# Patient Record
Sex: MALE | Race: WHITE | NOT HISPANIC OR LATINO | URBAN - METROPOLITAN AREA
[De-identification: names, ages, dates, MRNs, and addresses within clinical notes are randomized per-mention and may not be internally consistent; named-entity substitution may affect disease eponyms.]

---

## 2023-10-11 ENCOUNTER — DOCUMENTATION (OUTPATIENT)
Dept: TRANSPLANT | Facility: HOSPITAL | Age: 33
End: 2023-10-11
Payer: COMMERCIAL

## 2023-10-11 VITALS — BODY MASS INDEX: 28.53 KG/M2 | HEIGHT: 63 IN | WEIGHT: 161 LBS

## 2023-11-29 ENCOUNTER — DOCUMENTATION (OUTPATIENT)
Dept: TRANSPLANT | Facility: HOSPITAL | Age: 33
End: 2023-11-29
Payer: COMMERCIAL

## 2023-11-29 DIAGNOSIS — I10 HYPERTENSION, UNSPECIFIED TYPE: ICD-10-CM

## 2023-11-30 NOTE — PROGRESS NOTES
LISTING EDUCATION    Patient educated regarding the following prior to placement on the transplant waiting list:   The patient's medical condition, prognosis, and treatment plan.   The expectations and patient responsibilities while on the waiting list, including:   Keeping the transplant center informed of any changes in contact information or insurance coverage   Notifying the transplant center of any changes in medical status   Required testing and/or re-evaluation appointments while awaiting transplant   An overview of the surgical procedure, including potential risks and alternatives.   Information regarding what to expect during the inpatient admission and recovery period.   A discussion regarding organ offers and types of potential donors, including potential risks that may be associated with specific types of donors that could affect the success of the transplant or the health of the patient.  The right to refuse transplantation.     Patient was given the opportunity to have questions answered. Patient was provided a copy of the informed consent for transplant listing.    Education provided by:  Transplant Coordinator: Jocelynn Catherine RN  Transplant Physician: Severino Angulo MD    Signed listing informed consent received? Yes  Patient agrees to be listed for the following:  KDPI > 85% [X]  Donors After Circulatory Death (DCD) [X]  Donors with a Positive Core Antibody for Hepatitis B if immune [X]  Donors with Hepatitis C Virus to recipients with hepatitis C []  Donors with Hepatitis C Virus to Negative hepatitis C recipients [X]    Patient will be discussed at an upcoming selection committee to determine eligibility to be placed on the [facility] waiting list.

## 2023-11-30 NOTE — PROGRESS NOTES
Eval Clinic Note:  Patient attended evaluation appts on 12/01/2023 with Dr. Bowens and Dr. Angulo. Medications and allergies reviewed with patient.  Patient presented to appointment with female support.  Patient ambulated independently.    History:  Patient has a history of smoking, bipolar disorder.  Dialysis:  [facility], HD, since 05/2023.     Testing completed recently:  None  Testing needed for evaluation: CT A/P, echo, CXR, virtual finance  Listing Consent: KDPI >85%, DCD, Hep C, Hep B  Comments:  Provided patient with my contact info for questions and concerns.

## 2023-12-01 ENCOUNTER — DOCUMENTATION (OUTPATIENT)
Dept: TRANSPLANT | Facility: HOSPITAL | Age: 33
End: 2023-12-01

## 2023-12-01 ENCOUNTER — OFFICE VISIT (OUTPATIENT)
Dept: TRANSPLANT | Facility: HOSPITAL | Age: 33
End: 2023-12-01
Payer: COMMERCIAL

## 2023-12-01 ENCOUNTER — EDUCATION (OUTPATIENT)
Dept: TRANSPLANT | Facility: HOSPITAL | Age: 33
End: 2023-12-01
Payer: COMMERCIAL

## 2023-12-01 ENCOUNTER — APPOINTMENT (OUTPATIENT)
Dept: TRANSPLANT | Facility: HOSPITAL | Age: 33
End: 2023-12-01
Payer: COMMERCIAL

## 2023-12-01 ENCOUNTER — LAB REQUISITION (OUTPATIENT)
Dept: LAB | Facility: CLINIC | Age: 33
End: 2023-12-01
Payer: COMMERCIAL

## 2023-12-01 ENCOUNTER — LAB (OUTPATIENT)
Dept: LAB | Facility: LAB | Age: 33
End: 2023-12-01
Payer: COMMERCIAL

## 2023-12-01 VITALS
HEART RATE: 94 BPM | DIASTOLIC BLOOD PRESSURE: 104 MMHG | HEIGHT: 62 IN | BODY MASS INDEX: 29.57 KG/M2 | OXYGEN SATURATION: 97 % | TEMPERATURE: 97.3 F | WEIGHT: 160.7 LBS | SYSTOLIC BLOOD PRESSURE: 150 MMHG

## 2023-12-01 DIAGNOSIS — N18.6 ESRD (END STAGE RENAL DISEASE) (MULTI): Primary | ICD-10-CM

## 2023-12-01 DIAGNOSIS — Z01.818 PRE-TRANSPLANT EVALUATION FOR KIDNEY TRANSPLANT: ICD-10-CM

## 2023-12-01 DIAGNOSIS — N18.6 END STAGE RENAL DISEASE (MULTI): ICD-10-CM

## 2023-12-01 DIAGNOSIS — Z76.82 AWAITING ORGAN TRANSPLANT: ICD-10-CM

## 2023-12-01 LAB
ABO GROUP (TYPE) IN BLOOD: NORMAL
ALBUMIN SERPL BCP-MCNC: 4.1 G/DL (ref 3.4–5)
ALP SERPL-CCNC: 67 U/L (ref 33–120)
ALT SERPL W P-5'-P-CCNC: 6 U/L (ref 10–52)
AMYLASE SERPL-CCNC: 77 U/L (ref 29–103)
APPEARANCE UR: CLEAR
AST SERPL W P-5'-P-CCNC: 11 U/L (ref 9–39)
BILIRUB DIRECT SERPL-MCNC: 0.1 MG/DL (ref 0–0.3)
BILIRUB SERPL-MCNC: 0.4 MG/DL (ref 0–1.2)
BILIRUB UR STRIP.AUTO-MCNC: NEGATIVE MG/DL
BUN SERPL-MCNC: 23 MG/DL (ref 6–23)
C PEPTIDE SERPL-MCNC: 13.8 NG/ML (ref 0.7–3.9)
COLOR UR: ABNORMAL
CREAT SERPL-MCNC: 6.03 MG/DL (ref 0.5–1.3)
EBV EA IGG SER QL: NEGATIVE
EBV NA AB SER QL: POSITIVE
EBV VCA IGG SER IA-ACNC: POSITIVE
EBV VCA IGM SER IA-ACNC: ABNORMAL
ERYTHROCYTE [DISTWIDTH] IN BLOOD BY AUTOMATED COUNT: 14.9 % (ref 11.5–14.5)
EST. AVERAGE GLUCOSE BLD GHB EST-MCNC: 80 MG/DL
FLOW AUTOCROSSMATCH: NORMAL
GFR SERPL CREATININE-BSD FRML MDRD: 12 ML/MIN/1.73M*2
GLUCOSE UR STRIP.AUTO-MCNC: NEGATIVE MG/DL
HBA1C MFR BLD: 4.4 %
HBV CORE AB SER QL: NONREACTIVE
HBV SURFACE AB SER-ACNC: >1000 MIU/ML
HBV SURFACE AG SERPL QL IA: NONREACTIVE
HCT VFR BLD AUTO: 29 % (ref 41–52)
HCV AB SER QL: NONREACTIVE
HGB BLD-MCNC: 9.9 G/DL (ref 13.5–17.5)
HIV 1+2 AB+HIV1 P24 AG SERPL QL IA: NONREACTIVE
HLA CLASS I AB SCREEN,FC: NORMAL
HLA CLASS II AB SCREEN,FC: NORMAL
HLA CLS I TYP PNL BLD/T DONR HIGH RES: NORMAL
HLA RESULTS: NORMAL
HLA-DP2 QL: NORMAL
HLA-DQB1 HIGH RES: NORMAL
HLA-DRB1 HIGH RES: NORMAL
HOLD SPECIMEN: NORMAL
INR PPP: 1.1 (ref 0.9–1.1)
KETONES UR STRIP.AUTO-MCNC: NEGATIVE MG/DL
LEUKOCYTE ESTERASE UR QL STRIP.AUTO: NEGATIVE
MCH RBC QN AUTO: 34.1 PG (ref 26–34)
MCHC RBC AUTO-ENTMCNC: 34.1 G/DL (ref 32–36)
MCV RBC AUTO: 100 FL (ref 80–100)
NITRITE UR QL STRIP.AUTO: NEGATIVE
NRBC BLD-RTO: 0 /100 WBCS (ref 0–0)
PH UR STRIP.AUTO: 8 [PH]
PHOSPHATE SERPL-MCNC: 4.8 MG/DL (ref 2.5–4.9)
PLATELET # BLD AUTO: 337 X10*3/UL (ref 150–450)
PROT SERPL-MCNC: 7.2 G/DL (ref 6.4–8.2)
PROT UR STRIP.AUTO-MCNC: ABNORMAL MG/DL
PROTHROMBIN TIME: 12.5 SECONDS (ref 9.8–12.8)
RBC # BLD AUTO: 2.9 X10*6/UL (ref 4.5–5.9)
RBC # UR STRIP.AUTO: ABNORMAL /UL
RBC #/AREA URNS AUTO: NORMAL /HPF
RH FACTOR (ANTIGEN D): NORMAL
SP GR UR STRIP.AUTO: 1
T PALLIDUM AB SER QL: NONREACTIVE
UROBILINOGEN UR STRIP.AUTO-MCNC: <2 MG/DL
WBC # BLD AUTO: 11 X10*3/UL (ref 4.4–11.3)
WBC #/AREA URNS AUTO: NORMAL /HPF

## 2023-12-01 PROCEDURE — 81001 URINALYSIS AUTO W/SCOPE: CPT

## 2023-12-01 PROCEDURE — 82150 ASSAY OF AMYLASE: CPT

## 2023-12-01 PROCEDURE — 82565 ASSAY OF CREATININE: CPT

## 2023-12-01 PROCEDURE — 84153 ASSAY OF PSA TOTAL: CPT

## 2023-12-01 PROCEDURE — 83036 HEMOGLOBIN GLYCOSYLATED A1C: CPT

## 2023-12-01 PROCEDURE — 99204 OFFICE O/P NEW MOD 45 MIN: CPT | Performed by: STUDENT IN AN ORGANIZED HEALTH CARE EDUCATION/TRAINING PROGRAM

## 2023-12-01 PROCEDURE — 84154 ASSAY OF PSA FREE: CPT

## 2023-12-01 PROCEDURE — 99205 OFFICE O/P NEW HI 60 MIN: CPT | Performed by: INTERNAL MEDICINE

## 2023-12-01 PROCEDURE — 85610 PROTHROMBIN TIME: CPT

## 2023-12-01 PROCEDURE — 87389 HIV-1 AG W/HIV-1&-2 AB AG IA: CPT

## 2023-12-01 PROCEDURE — 84100 ASSAY OF PHOSPHORUS: CPT

## 2023-12-01 PROCEDURE — 86780 TREPONEMA PALLIDUM: CPT

## 2023-12-01 PROCEDURE — 86825 HLA X-MATH NON-CYTOTOXIC: CPT | Mod: OUT | Performed by: SURGERY

## 2023-12-01 PROCEDURE — 86664 EPSTEIN-BARR NUCLEAR ANTIGEN: CPT

## 2023-12-01 PROCEDURE — 86704 HEP B CORE ANTIBODY TOTAL: CPT

## 2023-12-01 PROCEDURE — 80076 HEPATIC FUNCTION PANEL: CPT

## 2023-12-01 PROCEDURE — 84681 ASSAY OF C-PEPTIDE: CPT

## 2023-12-01 PROCEDURE — 86803 HEPATITIS C AB TEST: CPT

## 2023-12-01 PROCEDURE — 84520 ASSAY OF UREA NITROGEN: CPT

## 2023-12-01 PROCEDURE — 86663 EPSTEIN-BARR ANTIBODY: CPT

## 2023-12-01 PROCEDURE — 86900 BLOOD TYPING SEROLOGIC ABO: CPT

## 2023-12-01 PROCEDURE — 86644 CMV ANTIBODY: CPT

## 2023-12-01 PROCEDURE — 99215 OFFICE O/P EST HI 40 MIN: CPT | Performed by: INTERNAL MEDICINE

## 2023-12-01 PROCEDURE — 80323 ALKALOIDS NOS: CPT

## 2023-12-01 PROCEDURE — 80307 DRUG TEST PRSMV CHEM ANLYZR: CPT

## 2023-12-01 PROCEDURE — 99214 OFFICE O/P EST MOD 30 MIN: CPT | Performed by: STUDENT IN AN ORGANIZED HEALTH CARE EDUCATION/TRAINING PROGRAM

## 2023-12-01 PROCEDURE — 85027 COMPLETE CBC AUTOMATED: CPT

## 2023-12-01 PROCEDURE — 86665 EPSTEIN-BARR CAPSID VCA: CPT

## 2023-12-01 PROCEDURE — 87799 DETECT AGENT NOS DNA QUANT: CPT

## 2023-12-01 PROCEDURE — 86901 BLOOD TYPING SEROLOGIC RH(D): CPT

## 2023-12-01 PROCEDURE — 86481 TB AG RESPONSE T-CELL SUSP: CPT

## 2023-12-01 PROCEDURE — 36415 COLL VENOUS BLD VENIPUNCTURE: CPT

## 2023-12-01 PROCEDURE — 86706 HEP B SURFACE ANTIBODY: CPT

## 2023-12-01 PROCEDURE — 87340 HEPATITIS B SURFACE AG IA: CPT

## 2023-12-01 PROCEDURE — 81379 HLA I TYPING COMPLETE HR: CPT | Mod: OUT | Performed by: SURGERY

## 2023-12-01 ASSESSMENT — PAIN SCALES - GENERAL: PAINLEVEL: 0-NO PAIN

## 2023-12-01 NOTE — PROGRESS NOTES
Kidney Transplant Evaluation Office Visit    Chief Complaint: Patient presents for kidney transplant evaluation    History of Present Illness:  Adalberto Brandon  is a 33 y.o. male presents with ESRD from Hypertension. He reports that he first found out about CKD/ESRD earlier this year when he was experiencing hypertensive symptoms. He has been on HD for ~6 months via a left UE AVF    Today in clinic, he denies any SOB, chest pain, palpitations, as well as any recent fever, abdominal pain, difficulty voiding or other urinary symptoms, constipation, or poor oral intake.    He does not work, but reports being able to do ADLs independently. He has a history of Bipolar/ Depression and is managed by his PCP. He reports quitting smoking in 2022 but does seem to have a smoker's cough in the clinic today.      Hemodialysis: 3 times a week  Dialysis Access: AVF  Urine Status: normal UOP.   Disease Etiology: Hypertensive nephropathy.   Disease Complications: Hypertension.   PVD: NO on exam, CT pending  Prior Abdominal Surgery: NO   Prior Malignancy: NO   BMI: 30    Review of Systems:  Cardiac: Denies chest pain, palpitations  : Normal urine output. Denies history of gross hematuria, nephrolithiasis, urinary retention, or recurrent UTIs.  Vascular: Denies personal or familial history of DVT/PE. No active claudication or non-healing LE wounds.  Extremities: LE Edema -   Functional Status: Can walk up 2 flights of stairs    Past Medical History:  Bipolar/ Depression  Asthma  ESRD on HD    Past Surgical History:  AVF    Social History:  Denies smoking   Occasional alcohol    Transfusions: None  Pregnancy: Not applicable  Prior transplant: Not applicable    Family History:  Mother: n/a  Father: n/a  Sibling: n/a    Physical Exam:  There were no vitals filed for this visit.  Gen: A+OX3; NAD  HEENT: PERRL, sclera anicteric, MMM  Cardiac: RRR  Chest: Normal inspiratory effort  Abdomen: S/NT/ND.  Ext: No LE  edema  Vascular: 2+ palpable femoral pulses  Psychiatric: Normal mood, affect    Assessment/Plan:  - The patient is a good candidate for kidney transplantation.  - Routine age/gender based screening  - Cardiac testing per protocol: ECHO  - Non-contrast CT scan abdomen/pelvis  - Good functional status      Surgical Considerations:  CT AP  Echo  Confirm smoking cessation    Transplant Education:    I had a discussion with this patient regarding 1 year graft and patient survival statistics following renal transplantation for both living and  donor allograft recipients. This data included [facility] data compared to National data readily available for review on https://www.SRTR.org. The patient also had attended the kidney transplant education class provided by the transplant institute.     The difference between allograft function was discussed comparing living donor, KDPI 0-85%, and >85% kidneys.     Further discussion included:  -The transplant selection committee process.  -The need for lifelong immunosuppressive therapy, and the side effects of these medications including the risk of infections, cancer, and lymphoma.  -The wait list time approximately is 5 years or more for  donor transplants and the statistical superiority of a living donor.     -Using identified donors with risk criteria for transmission of infection  -The possibility of utilizing  donors with known HCV antibody and/or VALERIE positivity and post-transplant treatment/surveillance protocol  -Potential transmission of infectious disease from any  donors, as well as living donors.   -The possibility of transmission of tumors and infections via the transplanted organ.  -The inability to completely test for all potential harmful tumors or infectious agents.  -The possibility of listing at multiple locations.     Surgical complications including need for reoperation(s) including but not limited  to:  -Bleeding.  -Repair of leaks.  -Control of infection.  -Blood clots in the transplant vessels.  -Possible kidney transplant removal.     The medical complications including but not limited to:  -Death.  -Cardiac.  -Pulmonary.  -Infectious.  -Neurologic.  -Other Complications.     We also discussed how the kidney transplant could function:  -Non-function and possible kidney transplant removal within the first 3 to 6 months.  -Delayed graft function (dialysis needed after transplant).  -The potential of recurrence of kidney disease leading to kidney transplant graft loss.    Time Attestation:  I spent 60 minutes with the patient, over 50 minutes in counseling and education as outlined above.    Pacheco Bowens MD, [facility]  Transplant & Hepatobiliary Surgery

## 2023-12-01 NOTE — PROGRESS NOTES
TRANSPLANT NEPHROLOGY CONSULT :   KIDNEY TRANSPLANT RECIPIENT EVALUATION        SERVICE DATE: 12/01/2023     REASON FOR CONSULT/CHIEF COMPLAINT:    FOR KIDNEY TRANSPLANT RECIPIENT EVALUATION.    HPI:    Mr. Brandon is a 33 y.o. male with past medical history significant for ESRD attributed to HTN, on HD x6 months via LUE AVF ( Renal [facility], [place], Dr. Farooq), bipolar disorder, other PMH as listed below. Here for eval as potential kidney transplant candidate.     Pt reports long h/o untreated HTN. No kidney biopsy report on file.     Has been HD dependent since 5/2023.     Tolerating HD fairly well. Reports occ post-HD low Bps but overall doing well.     Quit smoking last yr.     has good family support. No potential donor.     Still makes good amount of urine.     No recent admissions.    The patient is here for kidney transplant recipient evaluation. Mr. Brandon has had multiple complications from end stage severe renal disease including anemia, secondary hyperparathyroidism, and osteodystrophy. The patient is here today for an evaluation for kidney transplantation to improve quality of life and decrease the risk of cardiovascular disease, coronary artery disease and stroke.     The patient is doing well without complaints. Denied chest pain, shortness of breath, palpitation, dyspnea on exertion, dysuria, fever, nausea, vomiting, diarrhea and flu-liked symptoms. No swelling of the extremities. No recent hospitalization or ED visit.      ROS:  Review of  14 systems was performed system by system. See HPI. Otherwise, the symptoms were negative.    PAST MEDICAL HISTORY:  No past medical history on file.     PAST SURGICAL HISTORY:  Past Surgical History:   Procedure Laterality Date    CT GUIDED PERCUTANEOUS BIOPSY RENAL LEFT Left 5/16/2023    CT GUIDED PERCUTANEOUS BIOPSY RENAL LEFT 5/16/2023    IR CVC EXCHANGE  8/7/2023    IR CVC EXCHANGE 8/7/2023        SOCIAL HISTORY:  Social History     Socioeconomic  "History    Marital status:      Spouse name: Not on file    Number of children: Not on file    Years of education: Not on file    Highest education level: Not on file   Occupational History    Not on file   Tobacco Use    Smoking status: Not on file    Smokeless tobacco: Not on file   Substance and Sexual Activity    Alcohol use: Not on file    Drug use: Not on file    Sexual activity: Not on file   Other Topics Concern    Not on file   Social History Narrative    Not on file     Social Determinants of Health     Financial Resource Strain: Not on file   Food Insecurity: Not on file   Transportation Needs: Not on file   Physical Activity: Not on file   Stress: Not on file   Social Connections: Not on file   Intimate Partner Violence: Not on file   Housing Stability: Not on file       FAMILY HISTORY:  No family history on file.    MEDICATION LIST:  No current outpatient medications    ALLERGY  Not on File    PHYSICAL EXAM:    Visit Vitals  BP (!) 150/104   Pulse 94   Temp 36.3 °C (97.3 °F) (Temporal)   Ht 1.562 m (5' 1.5\")   Wt 72.9 kg (160 lb 11.2 oz)   SpO2 97%   BMI 29.87 kg/m²   BSA 1.78 m²        General Appearance - NAD, Good speech, oriented and alert  HEENT - Supple. Not pale. No jaundice. No cervical lymphadenopathy. Pharynx and tonsils are not injected.  CVS - RRR. Normal S1/S2. No murmur, click , rub or gallop  Lungs- clear to auscultation bilaterally  Abdomen - soft , not tender, no guarding, no rigidity. No hepatosplenomegaly. Normal bowel sounds. No masses and ascites. S/P Kidney transplant .  Transplanted kidney is not tender.   Musculoskeletal /Extremities - no edema. Full ROM. No joint tenderness.   Neuro/Psych - appropriate mood and affect. Motor power V/V all extremities. CN I -XII were grossly intact.  Skin - No visible rash  Dialysis access : E AVF is clean, dry and intact. No signs of infection.      LABS:    Lab Results   Component Value Date    WBC 11.0 12/01/2023    HGB 9.9 (L) " 12/01/2023    HCT 29.0 (L) 12/01/2023     12/01/2023    ALT 6 (L) 12/01/2023    AST 11 12/01/2023    CREATININE 6.03 (H) 12/01/2023    BUN 23 12/01/2023    INR 1.1 12/01/2023    HGBA1C 4.4 12/01/2023     par    ASSESSMENT AND PLAN:    After completion of taking history and physical examination, the patient seems to be a  suitable candidate to proceed with the rest of transplant evaluation.    patient will need to complete tests per protocol as part of pre-transplant eval.     The case will be presented at the selection committee at the Transplant [place].  The final decision from the committee will be sent out to notify the patient/primary care physician/ nephrologist. The above recommendations were discussed with the patient at length.     In addition, the following were also discussed:    - Risks and benefits of transplantation, both short-term and long-term    - Risk of primary graft non-function, DGF, SGF, rejection, primary disease recurrence, return to dialysis    - Risks of immunosuppression including infections, CA, CV risk    - Need for compliance with medications and medical care in general     The patient expressed understanding of the above and wishes to proceed.  I answered all of his questions. I urged the patient to look for living donors.    - I have spent over 60 minutes with the patient, reviewing medical record, lab result , CXR result and other specialty's notes. More than 50% of the time was spent in counseling, explaining about the transplantation and answering the questions. I also reviewed the medical record, blood test results, imaging and previous studies which were obtained from the nephrologists. I also order the tests needed to complete the evaluation and I will review the results of those tests.      Severino Angulo  Transplant Nephrology

## 2023-12-01 NOTE — PROGRESS NOTES
Patient attended in-person consent signing on 12/1/2023.  Virtual education was completed prior to in-person consent signing.  Accompanied to class with female support person.  Patient remained attentive throughout the review session, asking appropriate questions.  Evaluation consents were signed.

## 2023-12-02 LAB
CMV IGG AVIDITY SERPL IA-RTO: NONREACTIVE %
VZV QPCR,QUANT,PLASMA, VIRC: NOT DETECTED COPIES/ML

## 2023-12-03 LAB
EBV VCA IGM SER-ACNC: <10 U/ML (ref 0–43.9)
NIL(NEG) CONTROL SPOT COUNT: NORMAL
PANEL A SPOT COUNT: 0
PANEL B SPOT COUNT: 0
POS CONTROL SPOT COUNT: NORMAL
PSA FREE MFR SERPL: 67 %
PSA FREE SERPL-MCNC: 0.8 NG/ML
PSA SERPL IA-MCNC: 1.2 NG/ML (ref 0–4)
T-SPOT. TB INTERPRETATION: NEGATIVE

## 2023-12-04 LAB
AMPHETAMINES SERPL QL SCN: NEGATIVE NG/ML
ANNOTATION COMMENT IMP: NORMAL
BARBITURATES SERPL QL SCN: NEGATIVE NG/ML
BENZODIAZ SERPL QL SCN: NEGATIVE NG/ML
BUPRENORPHINE SERPL-MCNC: NEGATIVE NG/ML
CANNABINOIDS SERPL QL SCN: NEGATIVE NG/ML
COCAINE SERPL QL SCN: NEGATIVE NG/ML
COTININE SERPL-MCNC: <5 NG/ML
METHADONE SERPL QL SCN: NEGATIVE NG/ML
METHAMPHET SERPL QL: NEGATIVE NG/ML
NICOTINE SERPL-MCNC: <5 NG/ML
OPIATES SERPL QL SCN: NEGATIVE NG/ML
OXYCODONE SERPL QL: NEGATIVE NG/ML
PCP SERPL QL SCN: NEGATIVE NG/ML

## 2023-12-05 LAB
FLOW AUTOCROSSMATCH: NORMAL
HLA RESULTS: NORMAL

## 2023-12-07 LAB
HLA CLASS I AB SCREEN,FC: NORMAL
HLA CLASS II AB SCREEN,FC: NORMAL
HLA RESULTS: NORMAL

## 2023-12-07 ASSESSMENT — PATIENT HEALTH QUESTIONNAIRE - PHQ9
3. TROUBLE FALLING OR STAYING ASLEEP OR SLEEPING TOO MUCH: SEVERAL DAYS
9. THOUGHTS THAT YOU WOULD BE BETTER OFF DEAD, OR OF HURTING YOURSELF: NOT AT ALL
6. FEELING BAD ABOUT YOURSELF - OR THAT YOU ARE A FAILURE OR HAVE LET YOURSELF OR YOUR FAMILY DOWN: SEVERAL DAYS
8. MOVING OR SPEAKING SO SLOWLY THAT OTHER PEOPLE COULD HAVE NOTICED. OR THE OPPOSITE, BEING SO FIGETY OR RESTLESS THAT YOU HAVE BEEN MOVING AROUND A LOT MORE THAN USUAL: SEVERAL DAYS
7. TROUBLE CONCENTRATING ON THINGS, SUCH AS READING THE NEWSPAPER OR WATCHING TELEVISION: NOT AT ALL
4. FEELING TIRED OR HAVING LITTLE ENERGY: MORE THAN HALF THE DAYS
2. FEELING DOWN, DEPRESSED OR HOPELESS: MORE THAN HALF THE DAYS
SUM OF ALL RESPONSES TO PHQ QUESTIONS 1-9: 10
5. POOR APPETITE OR OVEREATING: MORE THAN HALF THE DAYS
SUM OF ALL RESPONSES TO PHQ9 QUESTIONS 1 & 2: 3
1. LITTLE INTEREST OR PLEASURE IN DOING THINGS: SEVERAL DAYS
10. IF YOU CHECKED OFF ANY PROBLEMS, HOW DIFFICULT HAVE THESE PROBLEMS MADE IT FOR YOU TO DO YOUR WORK, TAKE CARE OF THINGS AT HOME, OR GET ALONG WITH OTHER PEOPLE: SOMEWHAT DIFFICULT

## 2023-12-07 ASSESSMENT — ANXIETY QUESTIONNAIRES
4. TROUBLE RELAXING: SEVERAL DAYS
5. BEING SO RESTLESS THAT IT IS HARD TO SIT STILL: SEVERAL DAYS
GAD7 TOTAL SCORE: 5
7. FEELING AFRAID AS IF SOMETHING AWFUL MIGHT HAPPEN: NOT AT ALL
6. BECOMING EASILY ANNOYED OR IRRITABLE: SEVERAL DAYS
2. NOT BEING ABLE TO STOP OR CONTROL WORRYING: SEVERAL DAYS
1. FEELING NERVOUS, ANXIOUS, OR ON EDGE: SEVERAL DAYS
3. WORRYING TOO MUCH ABOUT DIFFERENT THINGS: NOT AT ALL
IF YOU CHECKED OFF ANY PROBLEMS ON THIS QUESTIONNAIRE, HOW DIFFICULT HAVE THESE PROBLEMS MADE IT FOR YOU TO DO YOUR WORK, TAKE CARE OF THINGS AT HOME, OR GET ALONG WITH OTHER PEOPLE: SOMEWHAT DIFFICULT

## 2023-12-11 ENCOUNTER — TELEPHONE (OUTPATIENT)
Dept: TRANSPLANT | Facility: HOSPITAL | Age: 33
End: 2023-12-11
Payer: COMMERCIAL

## 2023-12-11 ENCOUNTER — DOCUMENTATION (OUTPATIENT)
Dept: TRANSPLANT | Facility: HOSPITAL | Age: 33
End: 2023-12-11
Payer: COMMERCIAL

## 2023-12-12 ENCOUNTER — APPOINTMENT (OUTPATIENT)
Dept: TRANSPLANT | Facility: HOSPITAL | Age: 33
End: 2023-12-12
Payer: COMMERCIAL

## 2023-12-13 ENCOUNTER — DOCUMENTATION (OUTPATIENT)
Dept: TRANSPLANT | Facility: HOSPITAL | Age: 33
End: 2023-12-13
Payer: COMMERCIAL

## 2023-12-13 LAB
HLA CLS I TYP PNL BLD/T DONR HIGH RES: NORMAL
HLA RESULTS: NORMAL
HLA-DP2 QL: NORMAL
HLA-DQB1 HIGH RES: NORMAL
HLA-DRB1 HIGH RES: NORMAL

## 2023-12-13 RX ORDER — ASCORBIC ACID, TOCOPHERYL ACID SUCCINATE, THIAMINE, RIBOFLAVIN, NIACINAMIDE, PYRIDOXINE, FOLIC ACID, COBALAMIN, BIOTIN, PANTOTHENIC ACID, ZINC, SELENIUM 100; 1.5; 1.7; 20; 25; 3; 1; 300; 10; 15; 30; 7 MG/1; MG/1; MG/1; MG/1; MG/1; MG/1; MG/1; UG/1; MG/1; MG/1; [IU]/1; UG/1
TABLET, COATED ORAL
COMMUNITY
Start: 2023-09-25

## 2023-12-13 RX ORDER — LOSARTAN POTASSIUM 50 MG/1
TABLET ORAL
COMMUNITY
Start: 2023-10-12

## 2023-12-13 RX ORDER — SALINE NASAL SPRAY 1.5 OZ
SOLUTION NASAL
COMMUNITY
Start: 2023-11-06

## 2023-12-13 RX ORDER — PANTOPRAZOLE SODIUM 40 MG/1
40 TABLET, DELAYED RELEASE ORAL
COMMUNITY
Start: 2023-05-22 | End: 2024-05-21

## 2023-12-13 RX ORDER — CALCIUM ACETATE 667 MG/1
CAPSULE ORAL
COMMUNITY
Start: 2023-06-13

## 2023-12-13 RX ORDER — ATORVASTATIN CALCIUM 10 MG/1
10 TABLET, FILM COATED ORAL
COMMUNITY
Start: 2023-05-22

## 2023-12-13 RX ORDER — SODIUM BICARBONATE 650 MG/1
TABLET ORAL
COMMUNITY
Start: 2023-09-22

## 2023-12-13 RX ORDER — SEVELAMER CARBONATE 800 MG/1
TABLET, FILM COATED ORAL
COMMUNITY
Start: 2023-11-06

## 2023-12-13 RX ORDER — METOPROLOL SUCCINATE 100 MG/1
100 TABLET, EXTENDED RELEASE ORAL DAILY
COMMUNITY

## 2023-12-13 RX ORDER — ALBUTEROL SULFATE 90 UG/1
2 AEROSOL, METERED RESPIRATORY (INHALATION) EVERY 6 HOURS PRN
COMMUNITY
Start: 2021-09-08

## 2023-12-13 RX ORDER — ASCORBIC ACID, THIAMINE, RIBOFLAVIN, NIACINAMIDE, PYRIDOXINE, FOLIC ACID, COBALAMIN, BIOTIN, PANTOTHENIC ACID, ZINC 100; 1.5; 1.7; 20; 10; 1; 6; 300; 10; 5 MG/1; MG/1; MG/1; MG/1; MG/1; MG/1; UG/1; UG/1; MG/1; MG/1
1 TABLET, COATED ORAL
COMMUNITY
Start: 2023-09-25 | End: 2024-09-24

## 2023-12-13 RX ORDER — AMLODIPINE BESYLATE 10 MG/1
10 TABLET ORAL
COMMUNITY
Start: 2023-05-23 | End: 2024-05-22

## 2023-12-13 RX ORDER — CLONIDINE HYDROCHLORIDE 0.1 MG/1
0.1 TABLET ORAL EVERY 12 HOURS
COMMUNITY
Start: 2023-05-22 | End: 2024-05-21

## 2023-12-13 RX ORDER — DIVALPROEX SODIUM 250 MG/1
TABLET, DELAYED RELEASE ORAL EVERY 24 HOURS
COMMUNITY
Start: 2021-11-01

## 2023-12-13 RX ORDER — HYDRALAZINE HYDROCHLORIDE 100 MG/1
100 TABLET, FILM COATED ORAL 3 TIMES DAILY
COMMUNITY
Start: 2023-05-22 | End: 2024-05-21

## 2023-12-13 RX ORDER — HYDROXYZINE HYDROCHLORIDE 10 MG/1
10 TABLET, FILM COATED ORAL 2 TIMES DAILY PRN
COMMUNITY

## 2023-12-26 ENCOUNTER — APPOINTMENT (OUTPATIENT)
Dept: RADIOLOGY | Facility: CLINIC | Age: 33
End: 2023-12-26
Payer: COMMERCIAL

## 2023-12-28 ENCOUNTER — APPOINTMENT (OUTPATIENT)
Dept: CARDIOLOGY | Facility: CLINIC | Age: 33
End: 2023-12-28
Payer: COMMERCIAL

## 2024-01-09 ENCOUNTER — ANCILLARY PROCEDURE (OUTPATIENT)
Dept: RADIOLOGY | Facility: CLINIC | Age: 34
End: 2024-01-09
Payer: COMMERCIAL

## 2024-01-09 DIAGNOSIS — Z01.818 PRE-TRANSPLANT EVALUATION FOR KIDNEY TRANSPLANT: ICD-10-CM

## 2024-01-09 PROCEDURE — 74176 CT ABD & PELVIS W/O CONTRAST: CPT

## 2024-01-17 NOTE — PROGRESS NOTES
Adalberto Brandon is a 33 y.o. male on day 0 of admission presenting with No Principal Problem: There is no principal problem currently on the Problem List. Please update the Problem List and refresh..    Subjective   ***       Objective     Physical Exam    Last Recorded Vitals  There were no vitals taken for this visit.  Intake/Output last 3 Shifts:  No intake/output data recorded.    Relevant Results  {If you would like to pull in Medications, type .meds     If you would like to pull in Lab results for the last 24 hours, type .[id number]    If you would like to pull in Imaging results, type .imgrslt :99}    {Link to Stroke Scoring tools - Link :99}                       Assessment/Plan   Active Problems:  There are no active Hospital Problems.    ***     {This patient does not have an ACP note on file for this encounter, please fill one out - Advance Care Planning Activity :99}      Terri Catherine, RT

## 2024-01-22 ENCOUNTER — HOSPITAL ENCOUNTER (OUTPATIENT)
Dept: CARDIOLOGY | Facility: CLINIC | Age: 34
Discharge: HOME | End: 2024-01-22
Payer: COMMERCIAL

## 2024-01-22 DIAGNOSIS — Z01.818 PRE-TRANSPLANT EVALUATION FOR KIDNEY TRANSPLANT: ICD-10-CM

## 2024-01-22 PROCEDURE — 93306 TTE W/DOPPLER COMPLETE: CPT

## 2024-01-22 PROCEDURE — 93306 TTE W/DOPPLER COMPLETE: CPT | Performed by: INTERNAL MEDICINE

## 2024-01-23 LAB
AORTIC VALVE MEAN GRADIENT: 4 MMHG
AORTIC VALVE PEAK VELOCITY: 1.27 M/S
AV PEAK GRADIENT: 6.5 MMHG
AVA (PEAK VEL): 2.65 CM2
AVA (VTI): 2.42 CM2
EJECTION FRACTION APICAL 4 CHAMBER: 52.9
EJECTION FRACTION: 50 %
LEFT ATRIUM VOLUME AREA LENGTH INDEX BSA: 40.8 ML/M2
LEFT VENTRICLE INTERNAL DIMENSION DIASTOLE: 4.4 CM (ref 3.5–6)
LEFT VENTRICULAR OUTFLOW TRACT DIAMETER: 2 CM
MITRAL VALVE E/A RATIO: 2.42
MITRAL VALVE E/E' RATIO: 11.25
RIGHT VENTRICLE PEAK SYSTOLIC PRESSURE: 39.7 MMHG

## 2024-05-15 ENCOUNTER — TELEPHONE (OUTPATIENT)
Dept: TRANSPLANT | Facility: HOSPITAL | Age: 34
End: 2024-05-15
Payer: COMMERCIAL

## 2024-05-15 DIAGNOSIS — Z01.818 PRE-TRANSPLANT EVALUATION FOR KIDNEY TRANSPLANT: ICD-10-CM

## 2024-05-15 NOTE — TELEPHONE ENCOUNTER
Called pt to follow up on testing, no answer-LVM. Chart reviewed, needs: cardiology visit, txp psych, CXR, CT cardiac score and [id number].

## 2024-05-15 NOTE — TELEPHONE ENCOUNTER
Spoke with pt, ok for us to order and schedule his testing that's needed. Advised him that we are ordering blood work and get his walk-in CXR.

## 2024-05-22 ENCOUNTER — TELEPHONE (OUTPATIENT)
Dept: TRANSPLANT | Facility: HOSPITAL | Age: 34
End: 2024-05-22
Payer: COMMERCIAL

## 2024-08-08 ENCOUNTER — APPOINTMENT (OUTPATIENT)
Dept: RADIOLOGY | Facility: CLINIC | Age: 34
End: 2024-08-08
Payer: COMMERCIAL

## 2024-08-09 ENCOUNTER — HOSPITAL ENCOUNTER (OUTPATIENT)
Dept: RADIOLOGY | Facility: CLINIC | Age: 34
Discharge: HOME | End: 2024-08-09
Payer: COMMERCIAL

## 2024-08-09 DIAGNOSIS — Z01.818 PRE-TRANSPLANT EVALUATION FOR KIDNEY TRANSPLANT: ICD-10-CM

## 2024-08-09 PROCEDURE — 75571 CT HRT W/O DYE W/CA TEST: CPT

## 2024-08-27 PROBLEM — D64.9 ANEMIA: Status: ACTIVE | Noted: 2023-05-17

## 2024-08-27 PROBLEM — M79.604 LOW BACK PAIN RADIATING TO RIGHT LOWER EXTREMITY: Chronic | Status: ACTIVE | Noted: 2021-07-26

## 2024-08-27 PROBLEM — J45.30 MILD PERSISTENT ASTHMA WITHOUT COMPLICATION (HHS-HCC): Status: ACTIVE | Noted: 2023-09-21

## 2024-08-27 PROBLEM — H53.9 VISION DISTURBANCE: Status: ACTIVE | Noted: 2023-05-24

## 2024-08-27 PROBLEM — K21.9 GERD (GASTROESOPHAGEAL REFLUX DISEASE): Chronic | Status: ACTIVE | Noted: 2021-07-26

## 2024-08-27 PROBLEM — F41.0 PANIC ATTACKS: Chronic | Status: ACTIVE | Noted: 2021-07-26

## 2024-08-27 PROBLEM — N17.9 AKI (ACUTE KIDNEY INJURY) (CMS-HCC): Status: ACTIVE | Noted: 2022-09-22

## 2024-08-27 PROBLEM — G47.19 EXCESSIVE DAYTIME SLEEPINESS: Status: ACTIVE | Noted: 2023-06-26

## 2024-08-27 PROBLEM — N18.6 ESRD (END STAGE RENAL DISEASE) ON DIALYSIS (MULTI): Status: ACTIVE | Noted: 2024-01-05

## 2024-08-27 PROBLEM — Z99.2 ESRD (END STAGE RENAL DISEASE) ON DIALYSIS (MULTI): Status: ACTIVE | Noted: 2024-01-05

## 2024-08-27 PROBLEM — F17.210 NICOTINE DEPENDENCE, CIGARETTES, UNCOMPLICATED: Chronic | Status: ACTIVE | Noted: 2022-09-23

## 2024-08-27 PROBLEM — F31.9 BIPOLAR 1 DISORDER (MULTI): Chronic | Status: ACTIVE | Noted: 2022-09-23

## 2024-08-27 PROBLEM — F32.A DEPRESSION: Status: ACTIVE | Noted: 2023-05-15

## 2024-08-27 PROBLEM — E83.39 HYPERPHOSPHATEMIA: Status: ACTIVE | Noted: 2024-08-27

## 2024-08-27 PROBLEM — I12.9 HYPERTENSIVE NEPHROPATHY: Status: ACTIVE | Noted: 2023-05-22

## 2024-08-27 PROBLEM — R06.83 SNORING: Status: ACTIVE | Noted: 2023-05-22

## 2024-08-27 PROBLEM — R80.9 PROTEINURIA: Status: ACTIVE | Noted: 2023-05-15

## 2024-08-27 PROBLEM — I10 ESSENTIAL HYPERTENSION: Chronic | Status: ACTIVE | Noted: 2024-08-27

## 2024-08-27 PROBLEM — I15.1 HYPERTENSION SECONDARY TO OTHER RENAL DISORDERS: Status: ACTIVE | Noted: 2021-07-26

## 2024-08-27 PROBLEM — F90.9 ADHD (ATTENTION DEFICIT HYPERACTIVITY DISORDER): Chronic | Status: ACTIVE | Noted: 2021-07-26

## 2024-08-27 PROBLEM — M54.50 LOW BACK PAIN RADIATING TO RIGHT LOWER EXTREMITY: Chronic | Status: ACTIVE | Noted: 2021-07-26

## 2024-08-27 RX ORDER — ASPIRIN/CALCIUM CARB/MAGNESIUM 325 MG
4 TABLET ORAL EVERY 2 HOUR PRN
COMMUNITY
Start: 2022-10-07

## 2024-08-27 RX ORDER — FLUTICASONE PROPIONATE AND SALMETEROL 100; 50 UG/1; UG/1
1 POWDER RESPIRATORY (INHALATION) 2 TIMES DAILY
COMMUNITY
Start: 2024-01-05

## 2024-08-27 RX ORDER — FLUTICASONE PROPIONATE 50 MCG
2 SPRAY, SUSPENSION (ML) NASAL
COMMUNITY
Start: 2023-11-06 | End: 2024-11-05

## 2024-08-27 RX ORDER — SIMVASTATIN 10 MG/1
20 TABLET, FILM COATED ORAL NIGHTLY
COMMUNITY

## 2024-08-28 ENCOUNTER — APPOINTMENT (OUTPATIENT)
Dept: CARDIOLOGY | Facility: HOSPITAL | Age: 34
End: 2024-08-28
Payer: COMMERCIAL

## 2024-08-28 ENCOUNTER — APPOINTMENT (OUTPATIENT)
Dept: TRANSPLANT | Facility: HOSPITAL | Age: 34
End: 2024-08-28
Payer: COMMERCIAL

## 2024-08-28 DIAGNOSIS — Z01.810 PRE-OPERATIVE CARDIOVASCULAR EXAMINATION: Primary | ICD-10-CM

## 2024-10-10 ENCOUNTER — APPOINTMENT (OUTPATIENT)
Dept: CARDIOLOGY | Facility: HOSPITAL | Age: 34
End: 2024-10-10
Payer: COMMERCIAL

## 2024-10-10 DIAGNOSIS — Z01.810 ENCOUNTER FOR PRE-OPERATIVE CARDIOVASCULAR CLEARANCE: ICD-10-CM

## 2024-11-14 ENCOUNTER — CONSULT (OUTPATIENT)
Dept: CARDIOLOGY | Facility: HOSPITAL | Age: 34
End: 2024-11-14
Payer: COMMERCIAL

## 2024-11-14 VITALS
BODY MASS INDEX: 27.29 KG/M2 | OXYGEN SATURATION: 98 % | SYSTOLIC BLOOD PRESSURE: 178 MMHG | HEIGHT: 63 IN | HEART RATE: 100 BPM | DIASTOLIC BLOOD PRESSURE: 121 MMHG | WEIGHT: 154 LBS

## 2024-11-14 DIAGNOSIS — R00.2 PALPITATIONS: ICD-10-CM

## 2024-11-14 DIAGNOSIS — I10 HYPERTENSION, UNSPECIFIED TYPE: ICD-10-CM

## 2024-11-14 DIAGNOSIS — Z01.810 PREOP CARDIOVASCULAR EXAM: Primary | ICD-10-CM

## 2024-11-14 DIAGNOSIS — R07.9 CHEST PAIN, UNSPECIFIED TYPE: ICD-10-CM

## 2024-11-14 PROCEDURE — 93005 ELECTROCARDIOGRAM TRACING: CPT | Performed by: INTERNAL MEDICINE

## 2024-11-14 PROCEDURE — 99204 OFFICE O/P NEW MOD 45 MIN: CPT | Performed by: INTERNAL MEDICINE

## 2024-11-14 PROCEDURE — 99214 OFFICE O/P EST MOD 30 MIN: CPT | Performed by: INTERNAL MEDICINE

## 2024-11-14 RX ORDER — ISOSORBIDE MONONITRATE 60 MG/1
60 TABLET, EXTENDED RELEASE ORAL DAILY
Qty: 30 TABLET | Refills: 11 | Status: SHIPPED | OUTPATIENT
Start: 2024-11-14 | End: 2025-11-14

## 2024-11-14 ASSESSMENT — PAIN SCALES - GENERAL: PAINLEVEL_OUTOF10: 0-NO PAIN

## 2024-11-14 NOTE — PROGRESS NOTES
"Subjective   Adalberto Brandon is a 33 y.o. male.    Past medical history  End-stage renal disease due to hypertension on hemodialysis for the past 2 years and 6 months  Hypertension on amlodipine 10, clonidine twice daily 0.1, hydralazine 100 mg 3 times a day, losartan 50 mg once a day, metoprolol 100 mg once a day  Bipolar disorder  Hyperlipidemia on atorvastatin low-dose    Smoking: chews tobacco, quit smoking last year  Drugs quit 2 years back. Previous cocaine use  Alcohol: special occassion  Social: lives 2 wife and 2 kids. On disability    F/H: no family history of AMI    Chief Complaint:  Consult    HPI  Some chest pain specially at night. No relation to exertion.   Complains of SOB at night and when walking around. No SOB at rest  Palpitation once a week. 10 mins. Lightheaded and dizzy when palpitation  No LL edema  No syncope      ROS  Cramps in the legs    Objective   Constitutional:       Appearance: Not in distress.   Neck:      Vascular: JVD normal.   Pulmonary:      Effort: Pulmonary effort is normal.      Breath sounds: Normal breath sounds.   Cardiovascular:      PMI at left midclavicular line. Normal rate. Regular rhythm. Normal S1. Normal S2.       Murmurs: There is no murmur.   Edema:     Peripheral edema absent.   Abdominal:      General: Bowel sounds are normal.      Palpations: Abdomen is soft.   Skin:     General: Skin is warm and dry.   Neurological:      General: No focal deficit present.      Mental Status: Alert and oriented to person, place and time.         EKG: Signs of LVH, normal sinus rhythm        Lab Review:   Lab Results   Component Value Date    BUN 23 12/01/2023    CREATININE 6.03 (H) 12/01/2023     Lab Results   Component Value Date    WBC 11.0 12/01/2023    HGB 9.9 (L) 12/01/2023    HCT 29.0 (L) 12/01/2023     12/01/2023     12/01/2023     No results found for: \"CHOL\", \"TRIG\", \"HDL\", \"LDLDIRECT\"    TTE Dec 2023  CONCLUSIONS:   1. Left ventricular systolic function " is normal.   2. Mild to moderate tricuspid regurgitation visualized.   3. Mildly elevated RVSP.   4. Mild aortic valve regurgitation.    CACS Aug 2024  FINDINGS:  The score and distribution of calcium in the coronary arteries is as  follows:      LM 0  LAD 20  LCx 0  RCA 2.6      Total 22.6      The visualized mid/lower ascending thoracic aorta measures 22.6 cm in  diameter. The heart is normal in size. No pericardial effusion is  present.        Assessment/Plan   The encounter diagnosis was Preop cardiovascular exam.    33-year-old gentleman with a history of hypertension and end-stage renal disease possibly due to hypertension currently on dialysis.  The patient consideration for renal transplant and is here for a preop clearance.  Complains of some dyspnea on exertion and nonspecific chest pain along with palpitations.  His blood pressure is very high in the clinic today.  Apparently her blood pressure remains high at home also.      Preopclearance:  -Multiple risk factors in the form of smoking, high blood pressure and renal dysfunction  -Patient have some nonspecific chest pain  -Given the transplant surgery as long, would like to make sure that patient do not have any stress-induced ischemic defect  -CMR stress test is ordered    Hypertension:  -Poorly managed despite multiple medication  -Started om Imdur 60        -FUP in 3-4 weeks  -Reviewed again the symptoms of palpitation and order heart monitor if needed

## 2024-11-18 LAB
ATRIAL RATE: 100 BPM
P AXIS: 47 DEGREES
P OFFSET: 159 MS
P ONSET: 102 MS
PR INTERVAL: 228 MS
Q ONSET: 216 MS
QRS COUNT: 16 BEATS
QRS DURATION: 90 MS
QT INTERVAL: 364 MS
QTC CALCULATION(BAZETT): 469 MS
QTC FREDERICIA: 431 MS
R AXIS: -7 DEGREES
T AXIS: 43 DEGREES
T OFFSET: 398 MS
VENTRICULAR RATE: 100 BPM

## 2024-11-20 ENCOUNTER — DOCUMENTATION (OUTPATIENT)
Dept: TRANSPLANT | Facility: HOSPITAL | Age: 34
End: 2024-11-20
Payer: COMMERCIAL

## 2024-11-20 NOTE — PROGRESS NOTES
Kidney Patient Summary    Date of appointment: 2024    Name: Adalberto Brandon    : 1990    MRN: [id number]    Diagnosis:     ABO: A     Phase: Evaluation  Status: Active    Center Waitlist Date:       Last Seen:   Referring Nephrologist:       HD Unit:  [facility]   Dialysis Start:   Access:       Days:      PCP: No primary care provider on file.       Endocrinologist:     BMI Readings from Last 1 Encounters:   24 27.28 kg/m²     Blood Transfusion:    Medical History/Hospitalizations: No past medical history on file.    Surgical History:   Past Surgical History:   Procedure Laterality Date    CT GUIDED PERCUTANEOUS BIOPSY RENAL LEFT Left 2023    CT GUIDED PERCUTANEOUS BIOPSY RENAL LEFT 2023    IR CVC EXCHANGE  2023    IR CVC EXCHANGE 2023     Donors:     Staff:  Nephrologist:    Surgeon:     :      Testing Date:     Serologies:    CMV:     Nonreactive (Ref range: Nonreactive)  EBV Panel:  WOLFGANG-ADAMS VCA IGG:   Positive   WOLFGANG-ADAMS VCA IGM: <10.0   EBV EARLY ANTIGEN ANTIBODY, IGG: Negative   EPSTIEN-ADAMS NUCLEAR ANTIGEN AB: Positive   Tox:    AMPHETAMINE SCREEN BLOOD: Negative   METHAMPHETAMINE, BLOOD, SCREEN: Negative   BENZODIAZEPINES SCREEN BLOOD: Negative   BUPRENORPHINE SCREEN BLOOD: Negative   CANNABINOIDS SCREEN BLOOD: Negative   COCAINE SCREEN BLOOD: Negative   METHADONE SCREEN BLOOD: Negative   OXYCODONE SCREEN BLOOD: Negative   PHENCYCLIDINE SCREEN BLOOD: Negative   OPIATE SCREEN BLOOD: Negative   DRUG SCREEN COMMENT BLOOD: See Note   BARBITURATE SCREEN BLOOD: Negative   Cotinine: <5; <5  HBV ag:   Nonreactive  HBV ab:   >1,000.0  HBV c:     Nonreactive  HCV:        Nonreactive  HIV:    Nonreactive  RPR:    Nonreactive  TSpot:   Negative   VZV:   VARICELLA ZOSTER IGG: No results found for requested labs within last 365 days.   VARICELLA ZOSTER IGG INDEX: No results found for requested labs within last 365 days.   A1C:      "  HEMOGLOBIN A1C/HEMOGLOBIN TOTAL IN BLOOD: 4.4   ESTIMATED AVERAGE GLUCOSE FOR HBA1C: 80   CPep:  13.8  PSA:    1.2  Other:     Test/Consult Impression Next Scheduled Date   CXR XR chest 2 views    Result Date: 6/9/2024  No acute cardiopulmonary abnormality identified. Report Dictated on Workstation: [id number]  Electronically Signed By: Jordi Taylor MD  Electronically Signed Date/Time: 6/9/2024 1:57 PM EDT        EKG ECG 12 lead (Clinic Performed) (Preliminary) 11/14/2024  This result has not been signed. Information might be incomplete.    Narrative  Sinus rhythm with 1st degree AV block  Possible Left atrial enlargement  Left ventricular hypertrophy  Abnormal ECG  No previous ECGs available      Echo No results found.     CT Cardiac Score CT cardiac scoring wo IV contrast    Result Date: 8/12/2024  1. Coronary artery calcium score of 22.6*.   *Coronary artery calcium scoring may be helpful in predicting the risk for future coronary heart disease events.  According to the American College of Cardiology Foundation Clinical Expert Consensus Task Force, such testing provides important prognostic information in patients with more than one coronary heart disease risk factor. The coronary artery calcium score correlates with the annual risk of a non-fatal myocardial infarction or coronary heart disease death.   Coronary artery score            Annual Risk   0-99                             0.4% 100-399                        1.3% >400                            2.4%   These three \"breakpoints\" correspond to lower, intermediate and high risk states for future coronary events.  Such information should be used, along with appropriate clinical judgment, to make decisions regarding the intensity of risk factor management strategies to treat blood lipids and to modify other non-lipid coronary risk factors.   Reference: Peck P et al. Circulation.  2007; 115:402-426   2. Incompletely included right basilar " pleural effusion and likely bibasilar atelectasis and/or scarring.   MACRO: None   Signed by: Muriel Ward 8/12/2024 10:01 AM Dictation workstation:   [id number]       NM Stress Test No results found.     Cardiac MRI No results found.     Left Heart Catheterization No results found.     Cardiology last visit impression      Pulmonary Function Test No results found for this or any previous visit.    CT Abd/Pelvis CT abdomen pelvis wo IV contrast    Result Date: 1/10/2024  1.  Focal consolidation right middle lobe and patchy ground-glass opacities in both lower lobes are consistent with pneumonia. Small pleural effusions are present bilaterally. 2. Colonic diverticulosis with no evidence of acute diverticulitis 3. There is no athero sclerotic calcification noted in the aorta or iliac arteries.     MACRO: None   Signed by: Mary Felton 1/10/2024 11:43 AM Dictation workstation:   [id number]       Colonoscopy  No orders found for this or any previous visit.    Pap No results found for requested labs within last 1825 days.  No results found for requested labs within last 1825 days.    Mammogram No results found.     Other:

## 2024-11-21 ENCOUNTER — TELEPHONE (OUTPATIENT)
Dept: TRANSPLANT | Facility: HOSPITAL | Age: 34
End: 2024-11-21
Payer: COMMERCIAL

## 2025-01-21 ENCOUNTER — TELEPHONE (OUTPATIENT)
Dept: TRANSPLANT | Facility: HOSPITAL | Age: 35
End: 2025-01-21
Payer: COMMERCIAL

## 2025-01-21 NOTE — TELEPHONE ENCOUNTER
Called and spoke with patient. He is interested in continuing with the transplant evaluation. Will task to OGPAL Wall to reschedule  MR cardiac.

## 2025-02-13 ENCOUNTER — TELEPHONE (OUTPATIENT)
Facility: HOSPITAL | Age: 35
End: 2025-02-13
Payer: COMMERCIAL

## 2025-04-01 ENCOUNTER — HOSPITAL ENCOUNTER (OUTPATIENT)
Dept: RADIOLOGY | Facility: HOSPITAL | Age: 35
Discharge: HOME | End: 2025-04-01
Payer: COMMERCIAL

## 2025-04-01 VITALS — BODY MASS INDEX: 27.34 KG/M2 | WEIGHT: 154.32 LBS | HEIGHT: 63 IN

## 2025-04-01 DIAGNOSIS — R07.9 CHEST PAIN, UNSPECIFIED TYPE: ICD-10-CM

## 2025-04-01 PROCEDURE — 75563 CARD MRI W/STRESS IMG & DYE: CPT

## 2025-04-01 PROCEDURE — A9575 INJ GADOTERATE MEGLUMI 0.1ML: HCPCS | Performed by: RADIOLOGY

## 2025-04-01 PROCEDURE — 2550000001 HC RX 255 CONTRASTS: Performed by: RADIOLOGY

## 2025-04-01 RX ORDER — GADOTERATE MEGLUMINE 376.9 MG/ML
28 INJECTION INTRAVENOUS
Status: COMPLETED | OUTPATIENT
Start: 2025-04-01 | End: 2025-04-01

## 2025-04-01 RX ADMIN — GADOTERATE MEGLUMINE 28 ML: 376.9 INJECTION INTRAVENOUS at 09:51

## 2025-04-01 NOTE — NURSING NOTE
MRI STRESS        Stress protocol: Regadenoson  Regadenoson Dose: 0.4mg  Aminophylline Dose: 100mg     Resting Vitals:  BP: 162/111  HR: 102  SPO2: 92%  Resting ECG: NSR with 1st degree AV block, QTC 500Attending notified     Stress:  0.4mg IV push Regadenoson:  1 min: /108, , RR 18, 92% Symptoms: none  2 min: /115, , RR 22, 95% Symptoms: none     Reversal/Recovery:  100mg IV push Aminophylline:  1 min: /110, , RR 20, 90% Symptoms: none  2 min: /113, , RR 20, 92% Symptoms: none  4 min: /114, , RR 22, 93% Symptoms: none  6 min: /114, , RR 22, 96% Symptoms: none     Patients resting HR of 102 bpm marce to a maximum of 111 bpm.  Resting BP of 162/111 was the highest of the exam. Patients post stress EKG remained unchanged.  Patient discharged from the [facility] to home.

## 2025-04-14 ENCOUNTER — APPOINTMENT (OUTPATIENT)
Dept: CARDIOLOGY | Facility: HOSPITAL | Age: 35
End: 2025-04-14
Payer: COMMERCIAL

## 2025-04-30 ENCOUNTER — DOCUMENTATION (OUTPATIENT)
Facility: HOSPITAL | Age: 35
End: 2025-04-30
Payer: COMMERCIAL

## 2025-04-30 DIAGNOSIS — Z01.818 PRE-TRANSPLANT EVALUATION FOR KIDNEY TRANSPLANT: ICD-10-CM

## 2025-04-30 DIAGNOSIS — N18.6 ESRD (END STAGE RENAL DISEASE) (MULTI): ICD-10-CM

## 2025-04-30 NOTE — PROGRESS NOTES
Shereeal chart reviewed.    Tasked out for SW follow up, transplant psych, finance, cardiology followup, and walk in CXR and panorex (for dental clearance) and labs.    Also tasked out [id number]    Sent pt a [employer] message to alert him we would be reaching out to schedule.

## 2025-05-01 ENCOUNTER — TELEPHONE (OUTPATIENT)
Dept: TRANSPLANT | Facility: HOSPITAL | Age: 35
End: 2025-05-01
Payer: COMMERCIAL

## 2025-05-08 ENCOUNTER — TELEPHONE (OUTPATIENT)
Facility: HOSPITAL | Age: 35
End: 2025-05-08
Payer: COMMERCIAL

## 2025-05-08 ENCOUNTER — DOCUMENTATION (OUTPATIENT)
Facility: HOSPITAL | Age: 35
End: 2025-05-08
Payer: COMMERCIAL

## 2025-05-08 NOTE — PROGRESS NOTES
Kidney Patient Summary    Date of appointment: 2025    Name: Adalberto Brandon    : 1990    MRN: [id number]    Diagnosis: Hypertensive Nephrosclerosis    ABO:   ABO TYPE (no units)   Date Value   2023 A        Phase: Evaluation  Status: Active    Center Waitlist Date:       Last Seen:   Referring Nephrologist:       HD Unit:  [facility] DIALYS[facility]   Dialysis Start: 2023  Access:   LUE AVF    Days:  MWF    PCP: Isabell Saba MD       Endocrinologist: N/A    BMI Readings from Last 1 Encounters:   25 27.34 kg/m²     Blood Transfusion:    Medical History/Hospitalizations: Medical History[1]    Surgical History: Surgical History[2]  Donors: No    Staff:  Nephrologist: Kev   Surgeon: Gogo    :  Ayse      Testing Date:     Serologies:    CMV:     No results found for requested labs within last 365 days.  EBV Panel:  WOLFGANG-BARR VCA IGG:   EBV VCA, IgG  (no units)   Date Value   2023 Positive (A)       WOLFGANG-ADAMS VCA IGM:   EBV VCA, IgM  (no units)   Date Value   2023      Comment:     Due to reagent shortage, the EBV VCA IgM test has been temporarily sent out to [facility].       EBV VCA IgM Antibody (U/mL)   Date Value   2023 <10.0       EBV EARLY ANTIGEN ANTIBODY, IGG:   EBV Early Antigen Antibody, IgG (no units)   Date Value   2023 Negative       EPSTIEN-ADAMS NUCLEAR ANTIGEN AB:   EBV Nuclear Antigen Antibody, IgG (no units)   Date Value   2023 Positive (A)         Tox:    AMPHETAMINE SCREEN BLOOD:   Amphetamine Screen, S/P (ng/mL)   Date Value   2023 Negative       METHAMPHETAMINE, BLOOD, SCREEN:   Methamphetamine Screen, S/P (ng/mL)   Date Value   2023 Negative       BENZODIAZEPINES SCREEN BLOOD:   Benzodiazepine Screen, S/P (ng/mL)   Date Value   2023 Negative       BUPRENORPHINE SCREEN BLOOD:  Buprenorphine Screen, S/P (ng/mL)   Date Value   2023 Negative      "  CANNABINOIDS SCREEN BLOOD:   Cannabinoids Screen, S/P (ng/mL)   Date Value   12/01/2023 Negative       COCAINE SCREEN BLOOD:   Cocaine Screen Screen, S/P (ng/mL)   Date Value   12/01/2023 Negative       METHADONE SCREEN BLOOD:   No results found for: \"METHA\"    OXYCODONE SCREEN BLOOD:   Oxycodone Screen, S/P (ng/mL)   Date Value   12/01/2023 Negative       PHENCYCLIDINE SCREEN BLOOD:   Phencyclidine Screen, S/P (ng/mL)   Date Value   12/01/2023 Negative       OPIATE SCREEN BLOOD:   Opiate Screen, S/P (ng/mL)   Date Value   12/01/2023 Negative       DRUG SCREEN COMMENT BLOOD:   Drug Screen Comment S/P (no units)   Date Value   12/01/2023 See Note       BARBITURATE SCREEN BLOOD:   Barbiturate Screen, S/P (ng/mL)   Date Value   12/01/2023 Negative       Cotinine:   Nicotine Blood Quantitative (ng/mL)   Date Value   12/01/2023 <5     Cotinine Blood Quantitative (ng/mL)   Date Value   12/01/2023 <5      HBV ag:     Hepatitis B Surface AG (no units)   Date Value   12/01/2023 Nonreactive      HBV ab:     Hepatitis B Surface AB (mIU/mL)   Date Value   12/01/2023 >1,000.0 (H)     HBV c:     No results found for: \"HEPBCIGM\"   HCV:          Hepatitis C AB (no units)   Date Value   12/01/2023 Nonreactive     HIV:      HIV 1/2 Antigen/Antibody Screen with Reflex to Confirmation (no units)   Date Value   12/01/2023 Nonreactive     RPR:      Syphilis Total Ab (no units)   Date Value   12/01/2023 Nonreactive      TSpot:   T-SPOT. TB Interpretation (no units)   Date Value   12/01/2023 Negative       VZV:   VARICELLA ZOSTER IGG:   No results found for: \"VARZG\"    VARICELLA ZOSTER IGG INDEX:   No results found for: \"IVARG\"    A1C:       HEMOGLOBIN A1C/HEMOGLOBIN TOTAL IN BLOOD: No results found for requested labs within last 365 days.   ESTIMATED AVERAGE GLUCOSE FOR HBA1C: No results found for requested labs within last 365 days.   CPep:   C-Peptide (ng/mL)   Date Value   12/01/2023 13.8 (H)      PSA:     PSA (ng/mL)   Date Value "   12/01/2023 1.2      Other:   5/12- Finance  5/12- Psych & Surgeon  6/18- Txp Cardio & S/W follow-up      Test/Consult Impression Next Scheduled Date   CXR 6/9/2024  Impression    No acute cardiopulmonary abnormality identified.       EKG ECG 12 lead (Clinic Performed) 11/14/2024    Narrative  Sinus rhythm with 1st degree AV block  Possible Left atrial enlargement  Left ventricular hypertrophy  Abnormal ECG  No previous ECGs available  Confirmed by Smooth العلي ([id number]) on 12/3/2024 9:03:11 AM        Echo 1/22/2024  CONCLUSIONS:   1. Left ventricular systolic function is normal.   2. Mild to moderate tricuspid regurgitation visualized.   3. Mildly elevated RVSP.   4. Mild aortic valve regurgitation.       CT Cardiac Score CT cardiac scoring wo IV contrast  Result Date: 8/12/2024  IMPRESSION:  1. Coronary artery calcium score of 22.6*.         NM Stress Test No results found for this or any previous visit.     Cardiac MRI  4/1/2025 MR cardiac w and wo IV contrast w regadenoson stress     IMPRESSION:  1. Left ventricular wall concentric hypertrophy with preserved global  systolic function. LVEF = 60 %.There are no segmental wall motion  abnormalities.  2. Enlarged right ventricle (RVEDVi = 153.5 mL/m2) with preserved  systolic function. RVEF = 60%.  3. No evidence of stress-induced ischemia.  4. There are no findings to suggest prior ischemic damage or an  infiltrative process. Mildly increased T1 mapping values within the  mid myocardial segments may indicate interstitial fibrosis.  5. Borderline biatrial enlargement.  6. Mild mitral regurgitation with regurgitant fraction of 23.8%.  7. Small right and trace left pleural effusion.  8. Enhancing subcutaneous nodule in the right superior anterior chest  wall. Recommend correlation with physical examination.        Left Heart Catheterization No results found for this or any previous visit.     Cardiology last visit impression  11/14/2025  Nayeli Calhoun MD       Preopclearance:  -Multiple risk factors in the form of smoking, high blood pressure and renal dysfunction  -Patient have some nonspecific chest pain  -Given the transplant surgery as long, would like to make sure that patient do not have any stress-induced ischemic defect  -CMR stress test is ordered     Hypertension:  -Poorly managed despite multiple medication  -Started om Imdur 60           -FUP in 3-4 weeks  -Reviewed again the symptoms of palpitation and order heart monitor if needed    Pulmonary Function Test No results found for this or any previous visit.    CT Abd/Pelvis CT abdomen pelvis wo IV contrast  Result Date: 1/10/2024  1.  Focal consolidation right middle lobe and patchy ground-glass opacities in both lower lobes are consistent with pneumonia. Small pleural effusions are present bilaterally. 2. Colonic diverticulosis with no evidence of acute diverticulitis 3. There is no athero sclerotic calcification noted in the aorta or iliac arteries.     MACRO: None   Signed by: Mary Felton 1/10/2024 11:43 AM Dictation workstation:   [id number]                               [1] No past medical history on file.  [2]   Past Surgical History:  Procedure Laterality Date    CT GUIDED PERCUTANEOUS BIOPSY RENAL LEFT Left 5/16/2023    CT GUIDED PERCUTANEOUS BIOPSY RENAL LEFT 5/16/2023    IR CVC EXCHANGE  8/7/2023    IR CVC EXCHANGE 8/7/2023

## 2025-05-09 ENCOUNTER — DOCUMENTATION (OUTPATIENT)
Dept: TRANSPLANT | Facility: HOSPITAL | Age: 35
End: 2025-05-09
Payer: COMMERCIAL

## 2025-05-09 ENCOUNTER — APPOINTMENT (OUTPATIENT)
Facility: HOSPITAL | Age: 35
End: 2025-05-09
Payer: COMMERCIAL

## 2025-05-09 NOTE — PROGRESS NOTES
Spoke to pt for virtual TFC appointment. [place] [employer] is active. Had an [employer] exchange policy; EV indicates this policy termed 02/01/2025. Patient has been on dialysis since 2022 and is not eligible for Medicare. Discussed donor coverage. Discussed importance of Medicaid redeterms. Pt had no questions. Discussed that [place] requires dental clearance for listing.

## 2025-05-12 ENCOUNTER — OFFICE VISIT (OUTPATIENT)
Facility: HOSPITAL | Age: 35
End: 2025-05-12
Payer: COMMERCIAL

## 2025-05-12 VITALS
BODY MASS INDEX: 30.64 KG/M2 | SYSTOLIC BLOOD PRESSURE: 153 MMHG | HEIGHT: 61 IN | OXYGEN SATURATION: 97 % | WEIGHT: 162.3 LBS | TEMPERATURE: 97.1 F | DIASTOLIC BLOOD PRESSURE: 92 MMHG | HEART RATE: 79 BPM

## 2025-05-12 DIAGNOSIS — N18.6 ESRD (END STAGE RENAL DISEASE) (MULTI): Primary | ICD-10-CM

## 2025-05-12 DIAGNOSIS — F14.91 COCAINE USE DISORDER IN REMISSION: ICD-10-CM

## 2025-05-12 DIAGNOSIS — Z01.818 PRE-TRANSPLANT EVALUATION FOR KIDNEY TRANSPLANT: Primary | ICD-10-CM

## 2025-05-12 DIAGNOSIS — F10.90 ALCOHOL USE DISORDER: ICD-10-CM

## 2025-05-12 PROCEDURE — 99215 OFFICE O/P EST HI 40 MIN: CPT

## 2025-05-12 PROCEDURE — 90791 PSYCH DIAGNOSTIC EVALUATION: CPT | Performed by: PSYCHOLOGIST

## 2025-05-12 PROCEDURE — 3008F BODY MASS INDEX DOCD: CPT

## 2025-05-12 PROCEDURE — 3077F SYST BP >= 140 MM HG: CPT

## 2025-05-12 PROCEDURE — 3080F DIAST BP >= 90 MM HG: CPT

## 2025-05-12 ASSESSMENT — PAIN SCALES - GENERAL: PAINLEVEL_OUTOF10: 0-NO PAIN

## 2025-05-12 NOTE — PROGRESS NOTES
BEHAVIORAL HEALTH: PSYCHOLOGICAL EVALUATION FOR TRANSPLANT CANDIDACY    Patient's Name: Adalberto Brandon  :  1990  MRN:  [id number]    Diagnosis: Pre-transplant evaluation for kidney transplant    Date of Service: 2025    Start Time: 11:15 am   End Time: 12;00 pm  Location: [facility] [place]     Reason for Referral: Adalberto Brandon has been diagnosed with End-stage renal disease in the setting of HTN and is being seen for a psychological assessment as one part of a comprehensive evaluation for consideration for transplantation. He has a reported history of bipolar disorder and ADHD.    PROBLEM LIST   Problem List[1]     CURRENT MEDICATIONS  Current Medications[2]    PRESENTING INFORMATION: Adalberto was seen today alone. Mood was euthymic. Affect was mood congruent. Motivation to move forward with transplant was considered high, though he admitted he feels a little out of his element in understanding what it would look like for him. He admitted some concern about whether his body will accept the graft and uncertainty about how the process would unfold.    ADHERENCE  Medications:   -Organization: pill organizer   -Missed doses: He has some difficulty remembering evening and bedtime doses, so he uses alarms.   -Refills: Pharmacy provides medications on time.   -Use of reminders: alarms.    Diet:   -Type of diet followed:  renal diet.   -Barriers to following this diet: is using binders, isn't always consistent with diet    Attendance at Appointments:   -Dialysis: home hemodialysis (performed at his parents' home two doors down from where he lives)    -start date of treatment: May 2022.    -length of session: 2.5 hours 4 times a week    -activities during session: watches TV, plays on phone, sleeps    -missed sessions: he will rearrange his schedule if he needs to miss one   -Medical appointments: Yes   -Mental health: Yes   -PT/Rehabilitation: N/A    SUBSTANCE  "USE  Tobacco:  chewing tobacco, half a can a week, started at age 7, is working on stopping    Alcohol: He estimated he might have one can of beer a week now, or less than that but will have something on a holiday or special occasion; heavy use in the past   -date of last use: stopped drinking heavily over 10 years ago   -amount consumed per episode at heaviest use: case a beer a day, started at age 13   -days drinking per week: daily   -duration of heaviest use: almost 10 years   -reason for stopping: He almost lost his family, realized his life was falling apart.   -withdrawal symptoms: tremors and seizures-had a seizure at work   -negative consequences of alcohol use:    -physical health: passed out at work (worked in [employer])    -legal issues: no, doesn't drive    -occupational: has lost a job    -interpersonal: almost lost family   -attempts to become sober in past: He was sober for a while, was attending AA. He admitted that when he drinks a beer now he thinks it would be nice to have more and it has occurred to him that he could relapse into heavier drinking.   -family history: uncle    Illicit Substance Use: cocaine, last used \"a few years ago\" (2018 or 2019), almost lost wife and son which is why he stopped    Substance Disorder Treatment: He went to AA.    PSYCHOLOGICAL HISTORY   Current Mood: \"I get moods here and there.\"    Previously Diagnosed Psychological Disorders:   Mood Disorders:   -Bipolar disorder: not sure how long ago it was diagnosed, said he had intense mood swings, could go days without sleeping, spent money he didn't have. He said the diagnosis was given while he was drinking heavily and using cocaine. (He had previously reported to a  that he was given the diagnosis at age 1.) I asked if his mood improved when he stopped drinking and he said it did; however, when asked if he still goes through periods without sleep he said last week he had a stretch of four days when he " "couldn't sleep. He feels irritable when he doesn't sleep well.   Developmental/Behavioral Disorders:   -ADHD: diagnosed at age 6, was on stimulant medication and said he continues to take something   Cognitive Disorders:   -Seizure Disorder: He takes Depakote to prevent seizures. The medication has stopped them.  -History of stroke: N/A  -Prior head injury: He has had head injuries in the past but didn't relate when or the circumstances, though some might have been related to sports. He said he had memory problems after, and thinks he still has memory problems.   Personality Disorders: unable to determine  Psychotic Disorders: none reported    Treatment for Psychological Disorders:  Outpatient counseling: He goes now, every Monday at 4:00 does telehealth. Started a couple of weeks ago.  Pharmacological management:   -previous psychotropic medications used: Strattera (was allergic to it), said he is on a new one now. Chart review indicates that in 2016 he had been on Celexa for about three years without improvement of symptoms. He was started on sertraline at that point.  -current psychotropic medications: Depakote, hydroxyzine; no mood stabilizing medications or stimulant medications can be found on his medication list  -prescribing provider: thinks it was a \"regular doctor\"; not sure if he's seen a psychiatrist  Inpatient treatment: none reported today, but a 2023 social work evaluation reports he had described being hospitalized at [age] years of age with ADHD and bipolar disorder     Suicidal/Homicidal Tendencies: denied; however, a 2016 note from [facility] reveals he was seen for depression and had indicated that in 2007 after breaking up with a girlfriend and losing an \"adopted\" daughter in the split he had tried jumping off roofs to kill himself.    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[email or web address])    Suicide Risk Assessment  Risk factors: " chronic medical illness and current psychiatric illness  Protective factors: marriage/partnered, children, therapeutic relationship, and expressed desire to live    Mental Status Exam:  General Appearance: well groomed, appropriate eye contact  Attitude/Behavior: cooperative  Motor: no psychomotor agitation or retardation, no tremor or other abnormal movements  Speech: normal rate, volume, prosody  Gait/Station: WFL  Mood: euthymic   Affect: euthymic, full-range  Thought Process: goal-directed, somewhat concrete  Thought Associations: no loosening of associations  Thought Content: normal  Perception: no perceptual abnormalities noted  Sensorium: alert and oriented to person, place, time and situation  Insight: fair  Judgment: fair  Cognition: alert and oriented    SOCIAL HISTORY  Siblings: one brother and one sister  Parents: living-live down the street, which is where he goes to dialyze   Relationship Status:  11 years  Children: one son (9) and one mary  Education: high school  Occupation: N/A, used to do [email or web address]  Employment Status: social security disability  Current living situation: lives with wife, son, stepson, and wife's brother and his family (has a wife and 3 kids)      IMPRESSIONS  Adherence: Chart review reflects episodes of Mr. Brandon presenting at local hospitals with hypertension and reports of having missed antihypertensive medications. He has some history of missing dialysis when still dialyzing in-center, possibly due to transportation issues. He does not have a good understanding of his mental health conditions or of the medications he is or should be taking to treat them. He also expressed uncertainty about aspects of the transplant process, suggesting to me that he needs more education before moving forward. Some of the problem could be related to low health literacy, but I also think there is some confusion about his actual diagnoses.    Relapse Risk Issues:    Risk Factors:    Daily consumption: Yes, in past  Family history of addiction: Yes  Polysubstance abuse: Yes  Abstinence less Than 5 years: Yes (though heavy drinking discontinued 10+ years ago)  Failed efforts at rehabilitation in the past: Yes  Psychiatric comorbidity: Yes  External locus of control: unknown     Protective Factors:  Development of a new social environment: Yes  Development of substitute activities: Yes  Acceptance of addiction: Yes    Estimated relapse risk to problematic/excessive drinking is moderate-high based on the issues identified above. (Please note that “low” risk does not indicate absence of risk.) All information collected today (unless otherwise noted) was provided from patient self-report, the veracity of which cannot be affirmed based on the potential for inexact memory, intentional minimization, or some other factor. Risk for relapse is based on this information. Mr. Brandon willingly acknowledged how problematic his prior alcohol and cocaine use were to him, causing him to nearly lose his wife and son, and resulting in serious health problems and loss of a job. He indicated he has successfully refrained from cocaine use for at least five years. His heavy, daily drinking stopped at least 10 years ago, according to his report, but he admitted he occasionally drinks a beer. He also recognized today the potential for his occasional drinking to cause a serious relapse but he continues to drink anyway. I am not clear what his actually psychiatric diagnoses are, but unstable mood and ADHD would also increase the likelihood of relapse. Mr. Brandon should suspend all alcohol use immediately and permanently.     Psychological Issues: ***     Social Support: {Txp Eval Social Support:03491}    PLAN  This assessment was conducted as part of a comprehensive evaluation with input provided by all members of the multidisciplinary team. Recommendations are not developed by any one team member and might be modified  over time with additional care visits, input from other providers, or new test results.    A pathway to listing will include the following:     Mr. Brandon needs routine PEt screens to document abstinence from alcohol use.              [1]   Patient Active Problem List  Diagnosis    ADHD (attention deficit hyperactivity disorder)    ANGEL (acute kidney injury)    Anemia    Bipolar 1 disorder (Multi)    Depression    ESRD (end stage renal disease) on dialysis (Multi)    Essential hypertension    Excessive daytime sleepiness    GERD (gastroesophageal reflux disease)    Hyperphosphatemia    Hypertension secondary to other renal disorders    Hypertensive nephropathy    Low back pain radiating to right lower extremity    Mild persistent asthma without complication ([facility])    Nicotine dependence, cigarettes, uncomplicated    Panic attacks    Proteinuria    Snoring    Vision disturbance   [2]   Current Outpatient Medications:     albuterol 90 mcg/actuation inhaler, Inhale 2 puffs every 6 hours if needed., Disp: , Rfl:     amLODIPine (Norvasc) 10 mg tablet, Take 1 tablet (10 mg) by mouth once daily., Disp: , Rfl:     atorvastatin (Lipitor) 10 mg tablet, Take 1 tablet (10 mg) by mouth once daily., Disp: , Rfl:     calcium acetate (Phoslo) 667 mg capsule, TAKE 1 CAPSULE BY MOUTH FOUR TIMES DAILY WITH MEALS AND SNACKS, Disp: , Rfl:     cloNIDine (Catapres) 0.1 mg tablet, Take 1 tablet (0.1 mg) by mouth every 12 hours., Disp: , Rfl:     Deep Sea Nasal 0.65 % nasal spray, Administer 2 sprays into each nostril if needed for congestion., Disp: , Rfl:     DIALYVITE 3000 3-70-15 mg-mcg-mg tablet, Take 1 tablet by mouth once daily., Disp: , Rfl:     divalproex (Depakote) 250 mg EC tablet, Take 1 tablet (250 mg) by mouth once every 24 hours., Disp: , Rfl:     fluticasone (Flonase) 50 mcg/actuation nasal spray, Administer 2 sprays into each nostril once daily., Disp: , Rfl:     fluticasone propion-salmeteroL (Advair Diskus) 100-50  mcg/dose diskus inhaler, Inhale 1 puff twice a day., Disp: , Rfl:     hydrALAZINE (Apresoline) 100 mg tablet, Take 1 tablet (100 mg) by mouth 3 times a day., Disp: , Rfl:     hydrOXYzine HCL (Atarax) 10 mg tablet, Take 1 tablet (10 mg) by mouth 2 times a day as needed for itching., Disp: , Rfl:     isosorbide mononitrate ER (Imdur) 60 mg 24 hr tablet, Take 1 tablet (60 mg) by mouth once daily. Do not crush or chew., Disp: 30 tablet, Rfl: 11    losartan (Cozaar) 50 mg tablet, Take 1 tablet (50 mg) by mouth once daily., Disp: , Rfl:     metoprolol succinate XL (Toprol-XL) 100 mg 24 hr tablet, Take 1 tablet (100 mg) by mouth once daily. Do not crush or chew., Disp: , Rfl:     nicotine polacrilex (Commit) 4 mg lozenge, Take 1 lozenge (4 mg) by mouth every 2 hours if needed for smoking cessation., Disp: , Rfl:     pantoprazole (ProtoNix) 40 mg EC tablet, Take 1 tablet (40 mg) by mouth once daily in the morning. Take before meals., Disp: , Rfl:     sevelamer carbonate (Renvela) 800 mg tablet, TAKE 2 TABLETS BY MOUTH THREE TIMES DAILY WITH MEALS, 2 TABLETS DAILY WITH A SNACK, Disp: , Rfl:     sodium bicarbonate 650 mg tablet, Take 1 tablet (650 mg) by mouth 3 times a day., Disp: , Rfl:       fluticasone (Flonase) 50 mcg/actuation nasal spray, Administer 2 sprays into each nostril once daily., Disp: , Rfl:     fluticasone propion-salmeteroL (Advair Diskus) 100-50 mcg/dose diskus inhaler, Inhale 1 puff twice a day., Disp: , Rfl:     hydrALAZINE (Apresoline) 100 mg tablet, Take 1 tablet (100 mg) by mouth 3 times a day., Disp: , Rfl:     hydrOXYzine HCL (Atarax) 10 mg tablet, Take 1 tablet (10 mg) by mouth 2 times a day as needed for itching., Disp: , Rfl:     isosorbide mononitrate ER (Imdur) 60 mg 24 hr tablet, Take 1 tablet (60 mg) by mouth once daily. Do not crush or chew., Disp: 30 tablet, Rfl: 11    losartan (Cozaar) 50 mg tablet, Take 1 tablet (50 mg) by mouth once daily., Disp: , Rfl:     metoprolol succinate XL (Toprol-XL) 100 mg 24 hr tablet, Take 1 tablet (100 mg) by mouth once daily. Do not crush or chew., Disp: , Rfl:     nicotine polacrilex (Commit) 4 mg lozenge, Take 1 lozenge (4 mg) by mouth every 2 hours if needed for smoking cessation., Disp: , Rfl:     pantoprazole (ProtoNix) 40 mg EC tablet, Take 1 tablet (40 mg) by mouth once daily in the morning. Take before meals., Disp: , Rfl:     sevelamer carbonate (Renvela) 800 mg tablet, TAKE 2 TABLETS BY MOUTH THREE TIMES DAILY WITH MEALS, 2 TABLETS DAILY WITH A SNACK, Disp: , Rfl:     sodium bicarbonate 650 mg tablet, Take 1 tablet (650 mg) by mouth 3 times a day., Disp: , Rfl:

## 2025-05-12 NOTE — PROGRESS NOTES
Chief Complaint: Patient presents for kidney transplant evaluation    History of Present Illness:  Adalberto Brandon  is a 34 y.o. male presents with ESRD from HTN.    Hemodialysis: home hemodialysis MWTF   ROS: normal UOP, no urinary retention/hematuria/recurrent UTIs/nephrolithiasis.   Disease Etiology: hypertensive nephropathy.   Disease Complications:heart failure and hypertension.   PVD: NO  Prior Malignancy: NO     Past Medical History:  ADHD  Bipolar type I  Depression  HTN  GERD    Past Surgical History:  R arm fx treated with pins  Ada teeth extraction  L AVF    Review of Systems:  Cardiac: Denies chest pain, palpitations  : Normal urine output. Denies history of gross hematuria, nephrolithiasis, urinary retention, or recurrent UTIs.  Vascular: Denies personal or familial history of DVT/PE. No active claudication or non-healing LE wounds.  Functional Status: Can walk up 2 flights of stairs    Transfusions:  None  Prior transplant: Not applicable    Family History:  Mother: DM  Sibling: sister DM, DVT    Social History:  Marital Status:   Occupation: disability  Tobacco: chew  EtOH: yes rare social    Physical Exam:  Gen: A+OX3; NAD  HEENT: PERRL, sclera anicteric, MMM  Cardiac: RRR  Chest: Normal inspiratory effort  Abdomen: S/NT/ND.  Ext: No LE edema  Vascular: 2+ palpable femoral pulses  Psychiatric: Normal mood, affect    Assessment/Plan:  - The patient is an good candidate for kidney transplantation.  - ECHO 2024 normal EF, mildly elevated RVSP  - CT cardiac 22.6  - CT A/P: good targets bilaterally, no PVD  - Seeing clin psycho today    Transplant Education:  I had a discussion with this patient regarding 1 year graft and patient survival statistics following renal transplantation for both living and  donor allograft recipients. This data included [facility] data compared to National data readily available for review on https://www.SRTR.org. The patient also  had attended the kidney transplant education class provided by the transplant institute.     The difference between allograft function was discussed comparing living donor, KDPI 0-85%, and >85% kidneys.     Further discussion included:  -The transplant selection committee process.  -The need for lifelong immunosuppressive therapy, and the side effects of these medications including the risk of infections, cancer, and lymphoma.  -The wait list time approximately is 5 years or more for  donor transplants and the statistical superiority of a living donor.     -Using identified donors with risk criteria for transmission of infection  -The possibility of utilizing  donors with known HCV antibody and/or VALERIE positivity and post-transplant treatment/surveillance protocol  -Potential transmission of infectious disease from any  donors, as well as living donors.   -The possibility of transmission of tumors and infections via the transplanted organ.  -The inability to completely test for all potential harmful tumors or infectious agents.  -The possibility of listing at multiple locations.     Surgical complications including need for reoperation(s) including but not limited to:  -Bleeding.  -Repair of leaks.  -Control of infection.  -Blood clots in the transplant vessels.  -Possible kidney transplant removal.     The medical complications including but not limited to:  -Death.  -Cardiac.  -Pulmonary.  -Infectious.  -Neurologic.  -Other Complications.     We also discussed how the kidney transplant could function:  -Non-function and possible kidney transplant removal within the first 3 to 6 months.  -Delayed graft function (dialysis needed after transplant).  -The potential of recurrence of kidney disease leading to kidney transplant graft loss.    Time Attestation:  I spent 45 minutes with the patient, over 40 minutes in counseling and education as outlined above.    Unique Wall MD, PHD, MPH,  FACS  Chief, Transplant and Hepatobiliary Surgery

## 2025-06-16 ENCOUNTER — APPOINTMENT (OUTPATIENT)
Dept: CARDIOLOGY | Facility: HOSPITAL | Age: 35
End: 2025-06-16
Payer: COMMERCIAL

## 2025-06-18 ENCOUNTER — OFFICE VISIT (OUTPATIENT)
Dept: CARDIOLOGY | Facility: HOSPITAL | Age: 35
End: 2025-06-18
Payer: COMMERCIAL

## 2025-06-18 ENCOUNTER — SOCIAL WORK (OUTPATIENT)
Facility: HOSPITAL | Age: 35
End: 2025-06-18
Payer: COMMERCIAL

## 2025-06-18 VITALS
SYSTOLIC BLOOD PRESSURE: 132 MMHG | OXYGEN SATURATION: 100 % | DIASTOLIC BLOOD PRESSURE: 86 MMHG | HEIGHT: 62 IN | HEART RATE: 71 BPM | BODY MASS INDEX: 29.11 KG/M2 | WEIGHT: 158.2 LBS

## 2025-06-18 DIAGNOSIS — R22.2 NODULE OF ANTERIOR CHEST WALL: ICD-10-CM

## 2025-06-18 DIAGNOSIS — Z01.818 PRE-TRANSPLANT EVALUATION FOR KIDNEY TRANSPLANT: Primary | ICD-10-CM

## 2025-06-18 PROCEDURE — 3008F BODY MASS INDEX DOCD: CPT | Performed by: STUDENT IN AN ORGANIZED HEALTH CARE EDUCATION/TRAINING PROGRAM

## 2025-06-18 PROCEDURE — 99215 OFFICE O/P EST HI 40 MIN: CPT | Performed by: STUDENT IN AN ORGANIZED HEALTH CARE EDUCATION/TRAINING PROGRAM

## 2025-06-18 PROCEDURE — 99212 OFFICE O/P EST SF 10 MIN: CPT

## 2025-06-18 PROCEDURE — 3079F DIAST BP 80-89 MM HG: CPT | Performed by: STUDENT IN AN ORGANIZED HEALTH CARE EDUCATION/TRAINING PROGRAM

## 2025-06-18 PROCEDURE — 3075F SYST BP GE 130 - 139MM HG: CPT | Performed by: STUDENT IN AN ORGANIZED HEALTH CARE EDUCATION/TRAINING PROGRAM

## 2025-06-18 ASSESSMENT — PATIENT HEALTH QUESTIONNAIRE - PHQ9
8. MOVING OR SPEAKING SO SLOWLY THAT OTHER PEOPLE COULD HAVE NOTICED. OR THE OPPOSITE, BEING SO FIGETY OR RESTLESS THAT YOU HAVE BEEN MOVING AROUND A LOT MORE THAN USUAL: NOT AT ALL
10. IF YOU CHECKED OFF ANY PROBLEMS, HOW DIFFICULT HAVE THESE PROBLEMS MADE IT FOR YOU TO DO YOUR WORK, TAKE CARE OF THINGS AT HOME, OR GET ALONG WITH OTHER PEOPLE: NOT DIFFICULT AT ALL
3. TROUBLE FALLING OR STAYING ASLEEP OR SLEEPING TOO MUCH: MORE THAN HALF THE DAYS
5. POOR APPETITE OR OVEREATING: NOT AT ALL
7. TROUBLE CONCENTRATING ON THINGS, SUCH AS READING THE NEWSPAPER OR WATCHING TELEVISION: NOT AT ALL
2. FEELING DOWN, DEPRESSED OR HOPELESS: SEVERAL DAYS
9. THOUGHTS THAT YOU WOULD BE BETTER OFF DEAD, OR OF HURTING YOURSELF: NOT AT ALL
6. FEELING BAD ABOUT YOURSELF - OR THAT YOU ARE A FAILURE OR HAVE LET YOURSELF OR YOUR FAMILY DOWN: NOT AT ALL
1. LITTLE INTEREST OR PLEASURE IN DOING THINGS: SEVERAL DAYS
4. FEELING TIRED OR HAVING LITTLE ENERGY: MORE THAN HALF THE DAYS
SUM OF ALL RESPONSES TO PHQ QUESTIONS 1-9: 6
SUM OF ALL RESPONSES TO PHQ9 QUESTIONS 1 & 2: 2

## 2025-06-18 ASSESSMENT — PAIN SCALES - GENERAL: PAINLEVEL_OUTOF10: 0-NO PAIN

## 2025-06-18 ASSESSMENT — ANXIETY QUESTIONNAIRES
6. BECOMING EASILY ANNOYED OR IRRITABLE: SEVERAL DAYS
5. BEING SO RESTLESS THAT IT IS HARD TO SIT STILL: NOT AT ALL
4. TROUBLE RELAXING: SEVERAL DAYS
7. FEELING AFRAID AS IF SOMETHING AWFUL MIGHT HAPPEN: NOT AT ALL
1. FEELING NERVOUS, ANXIOUS, OR ON EDGE: NOT AT ALL
2. NOT BEING ABLE TO STOP OR CONTROL WORRYING: NOT AT ALL
IF YOU CHECKED OFF ANY PROBLEMS ON THIS QUESTIONNAIRE, HOW DIFFICULT HAVE THESE PROBLEMS MADE IT FOR YOU TO DO YOUR WORK, TAKE CARE OF THINGS AT HOME, OR GET ALONG WITH OTHER PEOPLE: NOT DIFFICULT AT ALL
GAD7 TOTAL SCORE: 2
3. WORRYING TOO MUCH ABOUT DIFFERENT THINGS: NOT AT ALL

## 2025-06-18 NOTE — PROGRESS NOTES
Location of visit: [facility]   Type of Visit: General Cardiology    Chief Complaint:  Patient was referred to Cardiology by Abdominal Transplant Team for pretransplant evaluation.    History Of Present Illness:    Adalberto Brandon is a 34 y.o. male, with history significant for HTN, hypertensive heart disease, ESRD on hemodialysis, who visits Cardiology today as an follow-up assessment for kidney pre-transplant evaluation. Seen by Dr. Calhoun with low calcium score (22.6), stress MRI was requested and was negative for ischemia. There was mild wall thickening and normal LV function. It was reported the presence of a right chest wall nodule that needs better characterization.    Patient denies chest pain, dyspnea on exertion, shortness of breath, orthopnea, PND, nocturia, edema, palpitations, dizziness, lightheadedness, syncope, or claudication.    Blood pressure: 132/86 mmHg  HR: 71 bpm    Contrast allergy: no    Past Medical History:  He has no past medical history on file.    Past Surgical History:  He has a past surgical history that includes IR CVC exchange (8/7/2023) and CT guided renal left percutaneous biopsy (Left, 5/16/2023).    Social History:  He reports that he has quit smoking. His smoking use included cigarettes. He has been exposed to tobacco smoke. His smokeless tobacco use includes chew. He reports current alcohol use. He reports that he does not use drugs.    Family History:  Family History[1]  Allergies:  Atomoxetine and Mushroom    Outpatient Medications:  Current Outpatient Medications   Medication Instructions    albuterol 90 mcg/actuation inhaler 2 puffs, Every 6 hours PRN    amLODIPine (NORVASC) 10 mg, oral, Daily RT    atorvastatin (LIPITOR) 10 mg, Daily RT    calcium acetate (Phoslo) 667 mg capsule TAKE 1 CAPSULE BY MOUTH FOUR TIMES DAILY WITH MEALS AND SNACKS    cloNIDine (CATAPRES) 0.1 mg, oral, Every 12 hours    Deep Sea Nasal 0.65 % nasal spray Administer 2 sprays into each nostril  "if needed for congestion.    DIALYVITE 3000 3-70-15 mg-mcg-mg tablet Take 1 tablet by mouth once daily.    divalproex (DEPAKOTE) 250 mg, Every 24 hours    fluticasone (Flonase) 50 mcg/actuation nasal spray 2 sprays, Each Nostril, Daily RT    fluticasone propion-salmeteroL (Advair Diskus) 100-50 mcg/dose diskus inhaler 1 each, 2 times daily    hydrALAZINE (APRESOLINE) 100 mg, oral, 3 times daily    hydrOXYzine HCL (ATARAX) 10 mg, 2 times daily PRN    isosorbide mononitrate ER (IMDUR) 60 mg, oral, Daily, Do not crush or chew.    losartan (Cozaar) 50 mg tablet Take 1 tablet (50 mg) by mouth once daily.    metoprolol succinate XL (TOPROL-XL) 100 mg, Daily    nicotine polacrilex (COMMIT) 4 mg, Every 2 hour PRN    pantoprazole (PROTONIX) 40 mg, oral, Daily before breakfast    sevelamer carbonate (Renvela) 800 mg tablet TAKE 2 TABLETS BY MOUTH THREE TIMES DAILY WITH MEALS, 2 TABLETS DAILY WITH A SNACK    sodium bicarbonate 650 mg tablet Take 1 tablet (650 mg) by mouth 3 times a day.     Last Recorded Vitals:  Vitals:    06/18/25 1100   BP: 132/86   BP Location: Right arm   Patient Position: Sitting   Pulse: 71   SpO2: 100%   Weight: 71.8 kg (158 lb 3.2 oz)   Height: 1.575 m (5' 2\")     Physical Exam:      6/18/2025    11:00 AM 5/12/2025    11:00 AM 4/1/2025     8:56 AM 11/14/2024     4:32 PM 11/14/2024     4:31 PM 12/1/2023     2:09 PM   Vitals   Systolic 132 153  178 188 150   Diastolic 86 92  121 127 104   BP Location Right arm   Right arm Right arm    Heart Rate 71 79  100 105 94   Temp  36.2 °C (97.1 °F)    36.3 °C (97.3 °F)   Height 1.575 m (5' 2\") 1.549 m (5' 1\") 1.6 m (5' 2.99\")  1.6 m (5' 3\") 1.562 m (5' 1.5\")   Weight (lb) 158.2 162.3 154.32  154 160.7   BMI 28.94 kg/m2 30.67 kg/m2 27.34 kg/m2  27.28 kg/m2 29.87 kg/m2   BSA (m2) 1.77 m2 1.78 m2 1.76 m2  1.76 m2 1.78 m2   Visit Report Report Report    Report    Report    Report     Wt Readings from Last 5 Encounters:   06/18/25 71.8 kg (158 lb 3.2 oz)   05/12/25 " 73.6 kg (162 lb 4.8 oz)   04/01/25 70 kg (154 lb 5.2 oz)   11/14/24 69.9 kg (154 lb)   12/01/23 72.9 kg (160 lb 11.2 oz)     General: Sitting up comfortably in chair; in no apparent distress.  HEENT: Normocephalic; atraumatic. Well hydrated.  Eyes: Anicteric sclera. Extraocular movement intact.  Neck: Supple; no thyromegaly; normal jugular venous pressure, no bruits.  Respiratory: Bilateral air entry equal. No wheezing.  Cardiovascular: Normal S1, S2; no murmurs auscultated.  Abdomen: Nondistended; nontender. (+) bowel sounds.  Extremities: No peripheral edema present. Pulses 2+ diffusely.  Neurological: Oriented to time, place, and person; nonfocal.  Psychiatric: Normal affect.     Last Labs Reviewed:  CBC -  Recent Labs     12/01/23  1056   WBC 11.0   HGB 9.9*   HCT 29.0*           CMP -  Recent Labs     12/01/23  1056   BUN 23   CREATININE 6.03*   EGFR 12*     Recent Labs     12/01/23  1056   ALBUMIN 4.1   ALKPHOS 67   ALT 6*   AST 11   BILITOT 0.4     COAGULATION PANEL -  Recent Labs     12/01/23  1056   INR 1.1     HEME/ENDO -  Recent Labs     12/01/23  1056   HGBA1C 4.4      Last Cardiology/Imaging Tests Personally Reviewed (if images available) and Interpreted:  ECG:  ECG 12 lead (Clinic Performed) 11/14/2024  Sinus rhythm at 100 bpm,  ms, possible LA enlargement, normal QRS axis, LVH, and normal ventricular repolarization.     Echocardiogram:  Transthoracic Echocardiogram 1/22/2024   1. Left ventricular systolic function is normal.   2. Mild to moderate tricuspid regurgitation visualized.   3. Mildly elevated RVSP.   4. Mild aortic valve regurgitation.    Cath:  No results found.    Stress Test:  MR cardiac w and wo IV contrast w regadenoson stress for MORPH FUNCT and valve DZ 04/01/2025  1. Left ventricular wall concentric hypertrophy with preserved global systolic function. LVEF = 60 %.There are no segmental wall motion abnormalities.  2. Enlarged right ventricle (RVEDVi = 153.5 mL/m2)  with preserved systolic function. RVEF = 60%.  3. No evidence of stress-induced ischemia.  4. There are no findings to suggest prior ischemic damage or an infiltrative process. Mildly increased T1 mapping values within the  mid myocardial segments may indicate interstitial fibrosis.  5. Borderline biatrial enlargement.  6. Mild mitral regurgitation with regurgitant fraction of 23.8%.  7. Small right and trace left pleural effusion.  8. Enhancing subcutaneous nodule in the right superior anterior chest wall.     Cardiac CT/MRI:  CT cardiac scoring wo IV contrast 08/09/2024  The score and distribution of calcium in the coronary arteries is as follows:  LM 0  LAD 20  LCx 0  RCA 2.6  Total 22.6    The visualized mid/lower ascending thoracic aorta measures 22.6 cm in diameter. The heart is normal in size. No pericardial effusion is present.    CV RISK FACTORS:   # Hypertension: Last BP: 132/86.  # Hyperlipidemia: Last Tchol No results found for requested labs within last 365 days. / LDL No results found for requested labs within last 365 days. / HDL No results found for requested labs within last 365 days. / TRIG No results found for requested labs within last 365 days. (No results in last year.).  # Type II Diabetes Mellitus: Last A1c No results found for requested labs within last 365 days. (No results in last year.).  # Obesity: Last BMI: 28.93.  # CKD: Last BUN/Cr (GFR): No results found for requested labs within last 365 days./No results found for requested labs within last 365 days. (No results found for requested labs within last 365 days.), No results in last year..    ASCV RISK:  The ASCVD Risk score (Maliha TOMLIN, et al., 2019) failed to calculate for the following reasons:    The 2019 ASCVD risk score is only valid for ages 40 to 79    Assessment:  34 y.o. male, with history significant for HTN, hypertensive heart disease, ESRD on hemodialysis, who visits Cardiology today as an follow-up assessment for kidney  pre-transplant evaluation. No red flag symptoms, normal LVEF, low coronary calcium and no ischemia on stress MRI on my review.   Assessment & Plan  Pre-transplant evaluation for kidney transplant  - Given the patient's clinical assessment, there is no contraindication to list for kidney transplant from the cardiac standpoint.   Nodule of anterior chest wall  - Small and seen on stress MRI and partially visualized on coronary calcium score  - Will request transplant team to complete assessment of nodule and define if needed for transplant clearance    No Cardiology follow up required, patient will return as needed  I spent 40 minutes assessing the case between pre-charting, face-to-face patient interaction, and documentation    Aron Delgdao MD       [1] No family history on file.

## 2025-06-18 NOTE — PROGRESS NOTES
"SW met with Pt and Pt's wife, Amanda, for psychosocial update. Pt was quiet and cooperative, with his wife Amanda answering most of SW's questions. Pt has [place] for insurance. Pt denies recent hospitalizations or emergency department visits. Pt and his wife reported some missed and shortened dialysis treatments for Pt due to his wife's schedule and issues with \"the machine\". Pt and Amanda report he misses or shortens treatments around 2-3x/month. Pt 's reports missing his afternoon medication \"quite a bit\" and is not able to state how often this occurs. Pt's wife reports he forgets to fill his pillbox. Pt medications are normally managed by his wife and Pt states he could not manage his medications on his own if needed.  Pt's primary support remains his wife Amanda, and Pt's secondary support remains his mother, Kailey. Kailey's number is [phone]. Pt's wife reports she is currently dealing with carpal tunnel and hoping to address this issue before Pt would be transplanted, but states that Kailey would be able to take off work to assist Pt if needed. Pt described their mood as \"good\" and denies any recent SI or SA. Pt scored a 6 on the PHQ-9, indicating mild clinical depression. Pt scored a 2 on the PAM-7, indicating minimal clinical anxiety. Pt engages in current substance use. Pt reports he uses \"snuff\" \"a lot\", using about half a can a day. Pt and his wife report he will occasionally drink, about 1-2x/month, \"sipping\" on a beer during a sitting. Pt denies any illicit or prescription drug use. SW discussed the inpatient transplant stay. SW discussed the potential need for dialysis after transplant. SW also discussed the frequency of post op appointments - once a week surgeon/nephrologist visits and twice a week labs. Pt expressed understanding, but states he does not remember much about transplant process provided during education course and Pt's wife also states she has forgotten most of education. Pt is " moderate psychosocial risk due to lack of understanding regarding transplant process, poor understanding of medications, poor compliance with medications and dialysis, as well as current tobacco use.     Plan: SW will follow up with Pt 6 months to monitor risk factors. RANDALL agrees with transplant psych recommendation that Pt engage in education classes again.

## 2025-06-19 PROBLEM — Z01.818 PRE-TRANSPLANT EVALUATION FOR KIDNEY TRANSPLANT: Status: ACTIVE | Noted: 2025-06-19

## 2025-06-19 PROBLEM — R22.2 NODULE OF ANTERIOR CHEST WALL: Status: ACTIVE | Noted: 2025-06-19

## 2025-06-19 NOTE — ASSESSMENT & PLAN NOTE
- Given the patient's clinical assessment, there is no contraindication to list for kidney transplant from the cardiac standpoint.

## 2025-06-19 NOTE — ASSESSMENT & PLAN NOTE
- Small and seen on stress MRI and partially visualized on coronary calcium score  - Will request transplant team to complete assessment of nodule and define if needed for transplant clearance

## 2025-06-20 ENCOUNTER — DOCUMENTATION (OUTPATIENT)
Facility: HOSPITAL | Age: 35
End: 2025-06-20
Payer: COMMERCIAL

## 2025-06-20 ENCOUNTER — TELEPHONE (OUTPATIENT)
Facility: HOSPITAL | Age: 35
End: 2025-06-20
Payer: COMMERCIAL

## 2025-06-20 DIAGNOSIS — Z01.818 PRE-TRANSPLANT EVALUATION FOR KIDNEY TRANSPLANT: Primary | ICD-10-CM

## 2025-06-20 NOTE — PROGRESS NOTES
Kidney Patient Summary    Date of appointment: 2025    Name: Adalberto Brandon    : 1990    MRN: [id number]    Diagnosis: Hypertensive Nephrosclerosis    ABO:   ABO TYPE (no units)   Date Value   2023 A        Phase: Evaluation  Status: Active    Center Waitlist Date:       Last Seen:   Referring Nephrologist: Dr. Mary Farooq MD    HD Unit: [facility]   Dialysis Start: 2023  Access:   LUE AVF    Days:  MWTF    PCP: Isabell Saba MD        Endocrinologist:     BMI Readings from Last 1 Encounters:   25 28.94 kg/m²     Blood Transfusion: No   Medical History/Hospitalizations: Medical History[1] ESRD, HTN, HF, Depression, Bipolar, ADHD, Asthma, Smoker.   Surgical History: Surgical History[2]Biopsy left kidney  Donors: No    Staff:  Nephrologist: Kev   Surgeon: Gogo    : Ayse          Testing Date:     Serologies:    CMV:     No results found for requested labs within last 365 days.  EBV Panel:  WOLFGANG-BARR VCA IGG:   EBV VCA, IgG  (no units)   Date Value   2023 Positive (A)       WOLFGANG-ADAMS VCA IGM:   EBV VCA, IgM  (no units)   Date Value   2023      Comment:     Due to reagent shortage, the EBV VCA IgM test has been temporarily sent out to [facility].       EBV VCA IgM Antibody (U/mL)   Date Value   2023 <10.0       EBV EARLY ANTIGEN ANTIBODY, IGG:   EBV Early Antigen Antibody, IgG (no units)   Date Value   2023 Negative       EPSTIEN-ADAMS NUCLEAR ANTIGEN AB:   EBV Nuclear Antigen Antibody, IgG (no units)   Date Value   2023 Positive (A)         Tox:    AMPHETAMINE SCREEN BLOOD:   Amphetamine Screen, S/P (ng/mL)   Date Value   2023 Negative       METHAMPHETAMINE, BLOOD, SCREEN:   Methamphetamine Screen, S/P (ng/mL)   Date Value   2023 Negative       BENZODIAZEPINES SCREEN BLOOD:   Benzodiazepine Screen, S/P (ng/mL)   Date Value   2023 Negative       BUPRENORPHINE  "SCREEN BLOOD:  Buprenorphine Screen, S/P (ng/mL)   Date Value   12/01/2023 Negative       CANNABINOIDS SCREEN BLOOD:   Cannabinoids Screen, S/P (ng/mL)   Date Value   12/01/2023 Negative       COCAINE SCREEN BLOOD:   Cocaine Screen Screen, S/P (ng/mL)   Date Value   12/01/2023 Negative       METHADONE SCREEN BLOOD:   No results found for: \"METHA\"    OXYCODONE SCREEN BLOOD:   Oxycodone Screen, S/P (ng/mL)   Date Value   12/01/2023 Negative       PHENCYCLIDINE SCREEN BLOOD:   Phencyclidine Screen, S/P (ng/mL)   Date Value   12/01/2023 Negative       OPIATE SCREEN BLOOD:   Opiate Screen, S/P (ng/mL)   Date Value   12/01/2023 Negative       DRUG SCREEN COMMENT BLOOD:   Drug Screen Comment S/P (no units)   Date Value   12/01/2023 See Note       BARBITURATE SCREEN BLOOD:   Barbiturate Screen, S/P (ng/mL)   Date Value   12/01/2023 Negative       Cotinine:   Nicotine Blood Quantitative (ng/mL)   Date Value   12/01/2023 <5     Cotinine Blood Quantitative (ng/mL)   Date Value   12/01/2023 <5      HBV ag:     Hepatitis B Surface AG (no units)   Date Value   12/01/2023 Nonreactive      HBV ab:     Hepatitis B Surface AB (mIU/mL)   Date Value   12/01/2023 >1,000.0 (H)     HBV c:     No results found for: \"HEPBCIGM\"   HCV:          Hepatitis C AB (no units)   Date Value   12/01/2023 Nonreactive     HIV:      HIV 1/2 Antigen/Antibody Screen with Reflex to Confirmation (no units)   Date Value   12/01/2023 Nonreactive     RPR:      Syphilis Total Ab (no units)   Date Value   12/01/2023 Nonreactive      TSpot:   T-SPOT. TB Interpretation (no units)   Date Value   12/01/2023 Negative       VZV:   VARICELLA ZOSTER IGG:   No results found for: \"VARZG\"    VARICELLA ZOSTER IGG INDEX:   No results found for: \"IVARG\"    A1C:       HEMOGLOBIN A1C/HEMOGLOBIN TOTAL IN BLOOD: No results found for requested labs within last 365 days.   ESTIMATED AVERAGE GLUCOSE FOR HBA1C: No results found for requested labs within last 365 days.   CPep: "   C-Peptide (ng/mL)   Date Value   12/01/2023 13.8 (H)      PSA:     PSA (ng/mL)   Date Value   12/01/2023 1.2      Other: Pt needs labs/PETH screen, ECHO, US chest, SW visit w/support, and repeat education class.    Test/Consult Impression Next Scheduled Date   CXR NEED    EKG ECG 12 lead (Clinic Performed) 11/14/2024    Narrative  Sinus rhythm with 1st degree AV block  Possible Left atrial enlargement  Left ventricular hypertrophy  Abnormal ECG  No previous ECGs available  Confirmed by Smooth العلي ([id number]) on 12/3/2024 9:03:11 AM        Echo NEED     CT Cardiac Score CT cardiac scoring wo IV contrast  Result Date: 8/12/2024  1. Coronary artery calcium score of 22.6*.         NM Stress Test No results found for this or any previous visit.     Cardiac MRI 4/1/2025  IMPRESSION:  1. Left ventricular wall concentric hypertrophy with preserved global  systolic function. LVEF = 60 %.There are no segmental wall motion  abnormalities.  2. Enlarged right ventricle (RVEDVi = 153.5 mL/m2) with preserved  systolic function. RVEF = 60%.  3. No evidence of stress-induced ischemia.  4. There are no findings to suggest prior ischemic damage or an  infiltrative process. Mildly increased T1 mapping values within the  mid myocardial segments may indicate interstitial fibrosis.  5. Borderline biatrial enlargement.  6. Mild mitral regurgitation with regurgitant fraction of 23.8%.  7. Small right and trace left pleural effusion.  8. Enhancing subcutaneous nodule in the right superior anterior chest  wall. Recommend correlation with physical examination.       Left Heart Catheterization No results found for this or any previous visit.     Cardiology last visit impression  11/14/2024  Nayeli Calhoun MD      Preopclearance:  -Multiple risk factors in the form of smoking, high blood pressure and renal dysfunction  -Patient have some nonspecific chest pain  -Given the transplant surgery as long, would like to make sure that patient do not  have any stress-induced ischemic defect  -CMR stress test is ordered     Hypertension:  -Poorly managed despite multiple medication  -Started om Imdur 60           -FUP in 3-4 weeks  -Reviewed again the symptoms of palpitation and order heart monitor if needed      Pulmonary Function Test No results found for this or any previous visit.    CT Abd/Pelvis CT abdomen pelvis wo IV contrast  Result Date: 1/10/2024  1.  Focal consolidation right middle lobe and patchy ground-glass opacities in both lower lobes are consistent with pneumonia. Small pleural effusions are present bilaterally. 2. Colonic diverticulosis with no evidence of acute diverticulitis 3. There is no athero sclerotic calcification noted in the aorta or iliac arteries.         US Chest Ordered    Colonoscopy  No results found for this or any previous visit.     Surgeon 2025-Adria  Assessment/Plan:  - The patient is an good candidate for kidney transplantation.  - ECHO 2024 normal EF, mildly elevated RVSP  - CT cardiac 22.6  - CT A/P: good targets bilaterally, no PVD  - Seeing clin psycho today    2023-Gogo  Assessment/Plan:  - The patient is a good candidate for kidney transplantation.  - Routine age/gender based screening  - Cardiac testing per protocol: ECHO  - Non-contrast CT scan abdomen/pelvis  - Good functional status        Surgical Considerations:  CT AP  Echo  Confirm smoking cessation    Neph 2023-Kev  ASSESSMENT AND PLAN:     After completion of taking history and physical examination, the patient seems to be a  suitable candidate to proceed with the rest of transplant evaluation.     patient will need to complete tests per protocol as part of pre-transplant eval.       Psych 2025  A pathway to listing will include the followin. Mr. Brandon needs routine PEth screens to document abstinence from alcohol use.   2. Mr. Brandon should undergo a psychiatric evaluation and follow through with  recommended interventions.  3. Mr. Brandon needs a social work update.  4. Mr. Brandon needs another education session in the company of caregivers        RANDALL/Ayse  NEED    12/1/2023  Pt's SIPAT score is “30” which indicates pt is a minimally acceptable candidate.  Consider listing.  Identified risk factors must be satisfactorily addressed before representing for consideration.  Pt's risk factors include psychiatric history, past substance use, and current nicotine use.  SW recommends pt see Transplant Psychologist.     PLAN  SW to confirm secondary support via phone.  RANDALL will follow up with pt in 6 to 12 months.                               [1] No past medical history on file.  [2]   Past Surgical History:  Procedure Laterality Date    CT GUIDED PERCUTANEOUS BIOPSY RENAL LEFT Left 5/16/2023    CT GUIDED PERCUTANEOUS BIOPSY RENAL LEFT 5/16/2023    IR CVC EXCHANGE  8/7/2023    IR CVC EXCHANGE 8/7/2023

## 2025-06-20 NOTE — TELEPHONE ENCOUNTER
Pre-Kidney Transplant Coordinator attempted to contact the pt to discuss testing needed for transplant evaluation, left a voicemail for a return call

## 2025-06-24 ENCOUNTER — TELEPHONE (OUTPATIENT)
Facility: HOSPITAL | Age: 35
End: 2025-06-24
Payer: COMMERCIAL

## 2025-06-24 ENCOUNTER — DOCUMENTATION (OUTPATIENT)
Facility: HOSPITAL | Age: 35
End: 2025-06-24
Payer: COMMERCIAL

## 2025-06-24 ENCOUNTER — TELEPHONE (OUTPATIENT)
Dept: TRANSPLANT | Facility: HOSPITAL | Age: 35
End: 2025-06-24
Payer: COMMERCIAL

## 2025-06-24 NOTE — TELEPHONE ENCOUNTER
Pre-kidney Transplant Coordinator contacted the pt to discuss testing needed for kidney transplant evaluation. Pt needs an US of the chest, CXR, CT abd/pelvis, labs, ECHO, SW with support person, and education class only. Task sent to [facility] to contact the pt to schedule.

## 2025-06-24 NOTE — PROGRESS NOTES
Task sent to [facility] to complete the following:  US right chest wall, ECHO,CXR, labs, SW w/ support, and education class only.

## 2025-06-26 ENCOUNTER — DOCUMENTATION (OUTPATIENT)
Facility: HOSPITAL | Age: 35
End: 2025-06-26
Payer: COMMERCIAL

## 2025-06-27 ENCOUNTER — APPOINTMENT (OUTPATIENT)
Dept: RADIOLOGY | Facility: HOSPITAL | Age: 35
End: 2025-06-27
Payer: COMMERCIAL

## 2025-06-27 ENCOUNTER — APPOINTMENT (OUTPATIENT)
Dept: CARDIOLOGY | Facility: HOSPITAL | Age: 35
End: 2025-06-27
Payer: COMMERCIAL

## 2025-06-27 ENCOUNTER — APPOINTMENT (OUTPATIENT)
Facility: HOSPITAL | Age: 35
End: 2025-06-27
Payer: COMMERCIAL

## 2025-07-02 ENCOUNTER — TELEPHONE (OUTPATIENT)
Facility: HOSPITAL | Age: 35
End: 2025-07-02
Payer: COMMERCIAL

## 2025-07-02 NOTE — TELEPHONE ENCOUNTER
Pre-Kidney Transplant Coordinator attempted to contact the pt to discuss kidney transplant eval. Voicemail left for a return call.

## 2025-07-07 ENCOUNTER — HOSPITAL ENCOUNTER (OUTPATIENT)
Dept: RADIOLOGY | Facility: HOSPITAL | Age: 35
Discharge: HOME | End: 2025-07-07
Payer: COMMERCIAL

## 2025-07-07 ENCOUNTER — APPOINTMENT (OUTPATIENT)
Dept: RADIOLOGY | Facility: HOSPITAL | Age: 35
End: 2025-07-07
Payer: COMMERCIAL

## 2025-07-07 DIAGNOSIS — Z01.818 PRE-TRANSPLANT EVALUATION FOR KIDNEY TRANSPLANT: ICD-10-CM

## 2025-07-07 PROCEDURE — 71046 X-RAY EXAM CHEST 2 VIEWS: CPT | Performed by: RADIOLOGY

## 2025-07-07 PROCEDURE — 71046 X-RAY EXAM CHEST 2 VIEWS: CPT

## 2025-07-12 PROBLEM — G47.30 SLEEP DISORDER BREATHING: Status: ACTIVE | Noted: 2023-05-22

## 2025-07-12 PROBLEM — I50.9 ACUTE DECOMPENSATED HEART FAILURE: Status: ACTIVE | Noted: 2024-11-16

## 2025-07-12 PROBLEM — E43 SEVERE MALNUTRITION (MULTI): Chronic | Status: ACTIVE | Noted: 2024-09-23

## 2025-07-12 PROBLEM — J96.01 ACUTE HYPOXIC RESPIRATORY FAILURE: Status: ACTIVE | Noted: 2024-12-01

## 2025-07-12 PROBLEM — G40.909 EPILEPSY: Status: ACTIVE | Noted: 2024-11-19

## 2025-07-12 PROBLEM — F17.200 TOBACCO DEPENDENCE: Status: ACTIVE | Noted: 2022-09-23

## 2025-07-14 ENCOUNTER — APPOINTMENT (OUTPATIENT)
Dept: CARDIOLOGY | Facility: HOSPITAL | Age: 35
End: 2025-07-14
Payer: COMMERCIAL

## 2025-07-14 ENCOUNTER — APPOINTMENT (OUTPATIENT)
Dept: RADIOLOGY | Facility: HOSPITAL | Age: 35
End: 2025-07-14
Payer: COMMERCIAL

## 2025-07-29 ENCOUNTER — APPOINTMENT (OUTPATIENT)
Dept: RADIOLOGY | Facility: HOSPITAL | Age: 35
End: 2025-07-29
Payer: COMMERCIAL

## 2025-08-04 ENCOUNTER — DOCUMENTATION (OUTPATIENT)
Facility: HOSPITAL | Age: 35
End: 2025-08-04

## 2025-08-04 ENCOUNTER — APPOINTMENT (OUTPATIENT)
Dept: CARDIOLOGY | Facility: HOSPITAL | Age: 35
End: 2025-08-04
Payer: COMMERCIAL

## 2025-08-04 NOTE — PROGRESS NOTES
Discuss to close, multiple no shows/cancellations, difficult to reach patient, several VM left with no return call - will attempt to call dialysis unit first to verify his contact info; if what we have is current will proceed with above

## 2025-08-06 ENCOUNTER — DOCUMENTATION (OUTPATIENT)
Facility: HOSPITAL | Age: 35
End: 2025-08-06
Payer: COMMERCIAL

## 2025-08-06 NOTE — PROGRESS NOTES
Reached out to dialysis unit to confirm patient's contact info - what we have been using is correct; dialysis unit confirmed patient is difficult to reach.

## 2025-08-08 ENCOUNTER — TELEPHONE (OUTPATIENT)
Facility: HOSPITAL | Age: 35
End: 2025-08-08
Payer: COMMERCIAL

## 2025-08-08 DIAGNOSIS — N18.6 ESRD (END STAGE RENAL DISEASE) (MULTI): ICD-10-CM

## 2025-08-08 DIAGNOSIS — Z01.818 PRE-TRANSPLANT EVALUATION FOR KIDNEY TRANSPLANT: ICD-10-CM

## 2025-08-08 DIAGNOSIS — I15.0 RENOVASCULAR HYPERTENSION: ICD-10-CM

## 2025-08-08 NOTE — TELEPHONE ENCOUNTER
Talked to patient.    He would like to finish eval, said his phone was not working.    Reviewed with Dr. Covington, okay to keep eval open.    In discussion w/pt, let him know I would send him a letter with necessary testing and we would reach out to assist him with scheduling.    Will need to update the dialysis unit as to the above, will send them a copy of the updated testing letter and a note explaining we are keeping the eval open.    A necessary testing letter was sent and additional updates were ordered and tasked out for scheduling today.

## 2025-08-11 ENCOUNTER — TELEPHONE (OUTPATIENT)
Facility: HOSPITAL | Age: 35
End: 2025-08-11
Payer: COMMERCIAL

## 2025-08-15 ENCOUNTER — DOCUMENTATION (OUTPATIENT)
Dept: TRANSPLANT | Facility: HOSPITAL | Age: 35
End: 2025-08-15
Payer: COMMERCIAL

## 2025-08-15 ENCOUNTER — APPOINTMENT (OUTPATIENT)
Facility: HOSPITAL | Age: 35
End: 2025-08-15
Payer: COMMERCIAL

## 2025-08-18 ENCOUNTER — TELEPHONE (OUTPATIENT)
Facility: HOSPITAL | Age: 35
End: 2025-08-18
Payer: COMMERCIAL

## 2025-08-26 ENCOUNTER — DOCUMENTATION (OUTPATIENT)
Facility: HOSPITAL | Age: 35
End: 2025-08-26
Payer: COMMERCIAL

## 2025-09-02 ENCOUNTER — COMMITTEE REVIEW (OUTPATIENT)
Facility: HOSPITAL | Age: 35
End: 2025-09-02

## 2025-09-02 ENCOUNTER — TELEPHONE (OUTPATIENT)
Facility: HOSPITAL | Age: 35
End: 2025-09-02
Payer: COMMERCIAL

## 2025-09-05 ENCOUNTER — APPOINTMENT (OUTPATIENT)
Dept: RADIOLOGY | Facility: CLINIC | Age: 35
End: 2025-09-05
Payer: COMMERCIAL

## 2025-09-08 ENCOUNTER — APPOINTMENT (OUTPATIENT)
Dept: CARDIOLOGY | Facility: HOSPITAL | Age: 35
End: 2025-09-08
Payer: COMMERCIAL

## 2025-09-09 ENCOUNTER — APPOINTMENT (OUTPATIENT)
Dept: CARDIOLOGY | Facility: CLINIC | Age: 35
End: 2025-09-09
Payer: COMMERCIAL